# Patient Record
Sex: FEMALE | Race: WHITE | Employment: UNEMPLOYED | URBAN - METROPOLITAN AREA
[De-identification: names, ages, dates, MRNs, and addresses within clinical notes are randomized per-mention and may not be internally consistent; named-entity substitution may affect disease eponyms.]

---

## 2017-03-13 PROBLEM — R07.9 CHEST PAIN: Status: ACTIVE | Noted: 2017-03-13

## 2017-11-27 PROBLEM — R07.9 CHEST PAIN: Status: RESOLVED | Noted: 2017-03-13 | Resolved: 2017-11-27

## 2018-01-30 PROBLEM — R29.818 SUSPECTED SLEEP APNEA: Status: ACTIVE | Noted: 2018-01-30

## 2018-01-30 PROBLEM — R06.00 DYSPNEA: Status: ACTIVE | Noted: 2018-01-30

## 2018-01-30 PROBLEM — J44.9 COPD (CHRONIC OBSTRUCTIVE PULMONARY DISEASE) (HCC): Status: ACTIVE | Noted: 2018-01-30

## 2018-03-08 ENCOUNTER — HOSPITAL ENCOUNTER (OUTPATIENT)
Dept: ULTRASOUND IMAGING | Age: 58
Discharge: HOME | End: 2018-03-08
Attending: FAMILY MEDICINE
Payer: COMMERCIAL

## 2018-03-08 DIAGNOSIS — F17.200 SMOKER: ICD-10-CM

## 2018-03-08 DIAGNOSIS — M79.604 PAIN OF RIGHT LOWER EXTREMITY: ICD-10-CM

## 2018-03-08 PROCEDURE — 93971 EXTREMITY STUDY: CPT

## 2018-04-09 ENCOUNTER — HOSPITAL ENCOUNTER (OUTPATIENT)
Dept: SLEEP MEDICINE | Age: 58
Discharge: HOME | End: 2018-04-09
Payer: COMMERCIAL

## 2018-04-09 PROCEDURE — 95810 POLYSOM 6/> YRS 4/> PARAM: CPT

## 2018-05-31 ENCOUNTER — HOSPITAL ENCOUNTER (OUTPATIENT)
Dept: LAB | Age: 58
Discharge: HOME | End: 2018-05-31

## 2018-05-31 PROCEDURE — 88312 SPECIAL STAINS GROUP 1: CPT | Performed by: INTERNAL MEDICINE

## 2018-05-31 PROCEDURE — 88305 TISSUE EXAM BY PATHOLOGIST: CPT | Performed by: INTERNAL MEDICINE

## 2018-07-02 ENCOUNTER — HOSPITAL ENCOUNTER (OUTPATIENT)
Dept: GENERAL RADIOLOGY | Age: 58
Discharge: HOME | End: 2018-07-02
Payer: COMMERCIAL

## 2018-07-02 DIAGNOSIS — J44.9 CHRONIC OBSTRUCTIVE PULMONARY DISEASE, UNSPECIFIED COPD TYPE (HCC): ICD-10-CM

## 2018-07-02 PROCEDURE — 71046 X-RAY EXAM CHEST 2 VIEWS: CPT

## 2018-07-11 PROBLEM — R09.02 HYPOXIA: Status: ACTIVE | Noted: 2018-07-11

## 2018-07-11 PROBLEM — G47.33 OSA (OBSTRUCTIVE SLEEP APNEA): Status: ACTIVE | Noted: 2018-07-11

## 2018-07-11 PROBLEM — R29.818 SUSPECTED SLEEP APNEA: Status: RESOLVED | Noted: 2018-01-30 | Resolved: 2018-07-11

## 2018-07-31 ENCOUNTER — HOSPITAL ENCOUNTER (OUTPATIENT)
Dept: CT IMAGING | Age: 58
Discharge: HOME | End: 2018-07-31
Payer: COMMERCIAL

## 2018-07-31 DIAGNOSIS — J44.9 CHRONIC OBSTRUCTIVE PULMONARY DISEASE, UNSPECIFIED COPD TYPE (HCC): ICD-10-CM

## 2018-07-31 DIAGNOSIS — F17.200 SMOKER: ICD-10-CM

## 2018-07-31 PROCEDURE — G0297 LDCT FOR LUNG CA SCREEN: HCPCS

## 2018-09-05 PROBLEM — Z87.891 PERSONAL HISTORY OF TOBACCO USE, PRESENTING HAZARDS TO HEALTH: Status: ACTIVE | Noted: 2018-09-05

## 2019-05-05 NOTE — PROGRESS NOTES
No evidence of dvt in right lower extremity. Please advise pt.
No evidence of dvt within right lower extremity. Advise pt.
Pt. Wants to get a referral to [address] orthopaedic
Constipation    MS (multiple sclerosis)

## 2019-05-24 ENCOUNTER — HOSPITAL ENCOUNTER (INPATIENT)
Age: 59
LOS: 1 days | Discharge: HOME | DRG: 247 | End: 2019-05-26
Attending: EMERGENCY MEDICINE | Admitting: INTERNAL MEDICINE
Payer: COMMERCIAL

## 2019-05-24 ENCOUNTER — APPOINTMENT (OUTPATIENT)
Dept: GENERAL RADIOLOGY | Age: 59
DRG: 247 | End: 2019-05-24
Attending: EMERGENCY MEDICINE
Payer: COMMERCIAL

## 2019-05-24 DIAGNOSIS — I21.4 NSTEMI (NON-ST ELEVATED MYOCARDIAL INFARCTION) (HCC): Primary | ICD-10-CM

## 2019-05-24 LAB
ALBUMIN SERPL-MCNC: 3.5 G/DL (ref 3.5–5)
ALBUMIN/GLOB SERPL: 1 {RATIO} (ref 1.2–3.5)
ALP SERPL-CCNC: 71 U/L (ref 50–136)
ALT SERPL-CCNC: 18 U/L (ref 12–65)
ANION GAP SERPL CALC-SCNC: 8 MMOL/L (ref 7–16)
AST SERPL-CCNC: 11 U/L (ref 15–37)
BASOPHILS # BLD: 0.1 K/UL (ref 0–0.2)
BASOPHILS NFR BLD: 1 % (ref 0–2)
BILIRUB SERPL-MCNC: 0.3 MG/DL (ref 0.2–1.1)
BUN SERPL-MCNC: 20 MG/DL (ref 6–23)
CALCIUM SERPL-MCNC: 8.9 MG/DL (ref 8.3–10.4)
CHLORIDE SERPL-SCNC: 110 MMOL/L (ref 98–107)
CO2 SERPL-SCNC: 28 MMOL/L (ref 21–32)
CREAT SERPL-MCNC: 1.03 MG/DL (ref 0.6–1)
DIFFERENTIAL METHOD BLD: ABNORMAL
EOSINOPHIL # BLD: 0.3 K/UL (ref 0–0.8)
EOSINOPHIL NFR BLD: 2 % (ref 0.5–7.8)
ERYTHROCYTE [DISTWIDTH] IN BLOOD BY AUTOMATED COUNT: 13.6 % (ref 11.9–14.6)
GLOBULIN SER CALC-MCNC: 3.6 G/DL (ref 2.3–3.5)
GLUCOSE SERPL-MCNC: 141 MG/DL (ref 65–100)
HCT VFR BLD AUTO: 47.2 % (ref 35.8–46.3)
HGB BLD-MCNC: 16.2 G/DL (ref 11.7–15.4)
IMM GRANULOCYTES # BLD AUTO: 0 K/UL (ref 0–0.5)
IMM GRANULOCYTES NFR BLD AUTO: 0 % (ref 0–5)
LYMPHOCYTES # BLD: 3.7 K/UL (ref 0.5–4.6)
LYMPHOCYTES NFR BLD: 32 % (ref 13–44)
MCH RBC QN AUTO: 30.8 PG (ref 26.1–32.9)
MCHC RBC AUTO-ENTMCNC: 34.3 G/DL (ref 31.4–35)
MCV RBC AUTO: 89.7 FL (ref 79.6–97.8)
MONOCYTES # BLD: 0.8 K/UL (ref 0.1–1.3)
MONOCYTES NFR BLD: 6 % (ref 4–12)
NEUTS SEG # BLD: 6.8 K/UL (ref 1.7–8.2)
NEUTS SEG NFR BLD: 58 % (ref 43–78)
NRBC # BLD: 0 K/UL (ref 0–0.2)
PLATELET # BLD AUTO: 188 K/UL (ref 150–450)
PMV BLD AUTO: 10.7 FL (ref 9.4–12.3)
POTASSIUM SERPL-SCNC: 3.3 MMOL/L (ref 3.5–5.1)
PROT SERPL-MCNC: 7.1 G/DL (ref 6.3–8.2)
RBC # BLD AUTO: 5.26 M/UL (ref 4.05–5.2)
SODIUM SERPL-SCNC: 146 MMOL/L (ref 136–145)
TROPONIN I BLD-MCNC: 0.04 NG/ML (ref 0.02–0.05)
TROPONIN I SERPL-MCNC: 0.11 NG/ML (ref 0.02–0.05)
WBC # BLD AUTO: 11.7 K/UL (ref 4.3–11.1)

## 2019-05-24 PROCEDURE — 84484 ASSAY OF TROPONIN QUANT: CPT

## 2019-05-24 PROCEDURE — 85025 COMPLETE CBC W/AUTO DIFF WBC: CPT

## 2019-05-24 PROCEDURE — 93005 ELECTROCARDIOGRAM TRACING: CPT | Performed by: EMERGENCY MEDICINE

## 2019-05-24 PROCEDURE — 99285 EMERGENCY DEPT VISIT HI MDM: CPT | Performed by: EMERGENCY MEDICINE

## 2019-05-24 PROCEDURE — 80053 COMPREHEN METABOLIC PANEL: CPT

## 2019-05-24 PROCEDURE — 83880 ASSAY OF NATRIURETIC PEPTIDE: CPT

## 2019-05-24 PROCEDURE — 71045 X-RAY EXAM CHEST 1 VIEW: CPT

## 2019-05-24 RX ORDER — HEPARIN SODIUM 5000 [USP'U]/ML
5000 INJECTION, SOLUTION INTRAVENOUS; SUBCUTANEOUS
Status: COMPLETED | OUTPATIENT
Start: 2019-05-24 | End: 2019-05-25

## 2019-05-24 RX ORDER — HEPARIN SODIUM 5000 [USP'U]/100ML
12-25 INJECTION, SOLUTION INTRAVENOUS
Status: DISCONTINUED | OUTPATIENT
Start: 2019-05-24 | End: 2019-05-25

## 2019-05-25 PROBLEM — Z72.0 TOBACCO ABUSE: Chronic | Status: ACTIVE | Noted: 2018-09-05

## 2019-05-25 PROBLEM — F43.9 STRESS AT HOME: Status: ACTIVE | Noted: 2019-05-25

## 2019-05-25 PROBLEM — K21.9 GERD (GASTROESOPHAGEAL REFLUX DISEASE): Chronic | Status: ACTIVE | Noted: 2019-05-25

## 2019-05-25 PROBLEM — I21.4 NSTEMI (NON-ST ELEVATED MYOCARDIAL INFARCTION) (HCC): Status: ACTIVE | Noted: 2019-05-25

## 2019-05-25 PROBLEM — Z72.0 TOBACCO ABUSE: Status: ACTIVE | Noted: 2018-09-05

## 2019-05-25 PROBLEM — E11.9 TYPE 2 DIABETES MELLITUS (HCC): Chronic | Status: ACTIVE | Noted: 2019-05-25

## 2019-05-25 PROBLEM — J44.9 COPD (CHRONIC OBSTRUCTIVE PULMONARY DISEASE) (HCC): Chronic | Status: ACTIVE | Noted: 2018-01-30

## 2019-05-25 LAB
ANION GAP SERPL CALC-SCNC: 7 MMOL/L (ref 7–16)
APTT PPP: 35.1 SEC (ref 24.7–39.8)
APTT PPP: 66.4 SEC (ref 24.7–39.8)
ATRIAL RATE: 64 BPM
ATRIAL RATE: 74 BPM
ATRIAL RATE: 78 BPM
BNP SERPL-MCNC: 24 PG/ML
BUN SERPL-MCNC: 19 MG/DL (ref 6–23)
CALCIUM SERPL-MCNC: 9 MG/DL (ref 8.3–10.4)
CALCULATED P AXIS, ECG09: 51 DEGREES
CALCULATED P AXIS, ECG09: 64 DEGREES
CALCULATED P AXIS, ECG09: 65 DEGREES
CALCULATED R AXIS, ECG10: 24 DEGREES
CALCULATED R AXIS, ECG10: 28 DEGREES
CALCULATED R AXIS, ECG10: 49 DEGREES
CALCULATED T AXIS, ECG11: 40 DEGREES
CALCULATED T AXIS, ECG11: 44 DEGREES
CALCULATED T AXIS, ECG11: 63 DEGREES
CHLORIDE SERPL-SCNC: 111 MMOL/L (ref 98–107)
CHOLEST SERPL-MCNC: 243 MG/DL
CO2 SERPL-SCNC: 26 MMOL/L (ref 21–32)
CREAT SERPL-MCNC: 0.8 MG/DL (ref 0.6–1)
DIAGNOSIS, 93000: NORMAL
ERYTHROCYTE [DISTWIDTH] IN BLOOD BY AUTOMATED COUNT: 13.6 % (ref 11.9–14.6)
GLUCOSE BLD STRIP.AUTO-MCNC: 105 MG/DL (ref 65–100)
GLUCOSE BLD STRIP.AUTO-MCNC: 108 MG/DL (ref 65–100)
GLUCOSE BLD STRIP.AUTO-MCNC: 110 MG/DL (ref 65–100)
GLUCOSE BLD STRIP.AUTO-MCNC: 124 MG/DL (ref 65–100)
GLUCOSE SERPL-MCNC: 100 MG/DL (ref 65–100)
HCT VFR BLD AUTO: 46.7 % (ref 35.8–46.3)
HDLC SERPL-MCNC: 34 MG/DL (ref 40–60)
HDLC SERPL: 7.1 {RATIO}
HGB BLD-MCNC: 15.8 G/DL (ref 11.7–15.4)
INR PPP: 1
LDLC SERPL CALC-MCNC: 165 MG/DL
LIPID PROFILE,FLP: ABNORMAL
MAGNESIUM SERPL-MCNC: 2.1 MG/DL (ref 1.8–2.4)
MCH RBC QN AUTO: 30.8 PG (ref 26.1–32.9)
MCHC RBC AUTO-ENTMCNC: 33.8 G/DL (ref 31.4–35)
MCV RBC AUTO: 91 FL (ref 79.6–97.8)
NRBC # BLD: 0 K/UL (ref 0–0.2)
P-R INTERVAL, ECG05: 192 MS
P-R INTERVAL, ECG05: 194 MS
P-R INTERVAL, ECG05: 198 MS
PLATELET # BLD AUTO: 149 K/UL (ref 150–450)
PMV BLD AUTO: 10.7 FL (ref 9.4–12.3)
POTASSIUM SERPL-SCNC: 3.4 MMOL/L (ref 3.5–5.1)
PROTHROMBIN TIME: 12.9 SEC (ref 11.7–14.5)
Q-T INTERVAL, ECG07: 408 MS
Q-T INTERVAL, ECG07: 408 MS
Q-T INTERVAL, ECG07: 426 MS
QRS DURATION, ECG06: 86 MS
QRS DURATION, ECG06: 90 MS
QRS DURATION, ECG06: 92 MS
QTC CALCULATION (BEZET), ECG08: 439 MS
QTC CALCULATION (BEZET), ECG08: 452 MS
QTC CALCULATION (BEZET), ECG08: 465 MS
RBC # BLD AUTO: 5.13 M/UL (ref 4.05–5.2)
SODIUM SERPL-SCNC: 144 MMOL/L (ref 136–145)
TRIGL SERPL-MCNC: 220 MG/DL (ref 35–150)
TROPONIN I BLD-MCNC: 0.02 NG/ML (ref 0.02–0.05)
TROPONIN I SERPL-MCNC: 0.1 NG/ML (ref 0.02–0.05)
TROPONIN I SERPL-MCNC: 0.11 NG/ML (ref 0.02–0.05)
VENTRICULAR RATE, ECG03: 64 BPM
VENTRICULAR RATE, ECG03: 74 BPM
VENTRICULAR RATE, ECG03: 78 BPM
VLDLC SERPL CALC-MCNC: 44 MG/DL (ref 6–23)
WBC # BLD AUTO: 10.4 K/UL (ref 4.3–11.1)

## 2019-05-25 PROCEDURE — 85347 COAGULATION TIME ACTIVATED: CPT

## 2019-05-25 PROCEDURE — 74011250636 HC RX REV CODE- 250/636

## 2019-05-25 PROCEDURE — 94640 AIRWAY INHALATION TREATMENT: CPT

## 2019-05-25 PROCEDURE — 99152 MOD SED SAME PHYS/QHP 5/>YRS: CPT

## 2019-05-25 PROCEDURE — 99153 MOD SED SAME PHYS/QHP EA: CPT

## 2019-05-25 PROCEDURE — 74011000250 HC RX REV CODE- 250: Performed by: NURSE PRACTITIONER

## 2019-05-25 PROCEDURE — 74011250636 HC RX REV CODE- 250/636: Performed by: NURSE PRACTITIONER

## 2019-05-25 PROCEDURE — 74011250636 HC RX REV CODE- 250/636: Performed by: INTERNAL MEDICINE

## 2019-05-25 PROCEDURE — C1769 GUIDE WIRE: HCPCS

## 2019-05-25 PROCEDURE — B2151ZZ FLUOROSCOPY OF LEFT HEART USING LOW OSMOLAR CONTRAST: ICD-10-PCS | Performed by: INTERNAL MEDICINE

## 2019-05-25 PROCEDURE — 65660000000 HC RM CCU STEPDOWN

## 2019-05-25 PROCEDURE — 82962 GLUCOSE BLOOD TEST: CPT

## 2019-05-25 PROCEDURE — 77030029997 HC DEV COM RDL R BND TELE -B

## 2019-05-25 PROCEDURE — 84484 ASSAY OF TROPONIN QUANT: CPT

## 2019-05-25 PROCEDURE — 85730 THROMBOPLASTIN TIME PARTIAL: CPT

## 2019-05-25 PROCEDURE — 80048 BASIC METABOLIC PNL TOTAL CA: CPT

## 2019-05-25 PROCEDURE — 92928 PRQ TCAT PLMT NTRAC ST 1 LES: CPT

## 2019-05-25 PROCEDURE — 96374 THER/PROPH/DIAG INJ IV PUSH: CPT | Performed by: EMERGENCY MEDICINE

## 2019-05-25 PROCEDURE — 94760 N-INVAS EAR/PLS OXIMETRY 1: CPT

## 2019-05-25 PROCEDURE — 83735 ASSAY OF MAGNESIUM: CPT

## 2019-05-25 PROCEDURE — 77030004534 HC CATH ANGI DX INFN CARD -A

## 2019-05-25 PROCEDURE — 74011250636 HC RX REV CODE- 250/636: Performed by: EMERGENCY MEDICINE

## 2019-05-25 PROCEDURE — 77030015766

## 2019-05-25 PROCEDURE — 93458 L HRT ARTERY/VENTRICLE ANGIO: CPT

## 2019-05-25 PROCEDURE — 74011636320 HC RX REV CODE- 636/320: Performed by: INTERNAL MEDICINE

## 2019-05-25 PROCEDURE — 85610 PROTHROMBIN TIME: CPT

## 2019-05-25 PROCEDURE — C1874 STENT, COATED/COV W/DEL SYS: HCPCS

## 2019-05-25 PROCEDURE — 36415 COLL VENOUS BLD VENIPUNCTURE: CPT

## 2019-05-25 PROCEDURE — 74011000250 HC RX REV CODE- 250: Performed by: INTERNAL MEDICINE

## 2019-05-25 PROCEDURE — C1887 CATHETER, GUIDING: HCPCS

## 2019-05-25 PROCEDURE — 4A023N7 MEASUREMENT OF CARDIAC SAMPLING AND PRESSURE, LEFT HEART, PERCUTANEOUS APPROACH: ICD-10-PCS | Performed by: INTERNAL MEDICINE

## 2019-05-25 PROCEDURE — C1725 CATH, TRANSLUMIN NON-LASER: HCPCS

## 2019-05-25 PROCEDURE — 80061 LIPID PANEL: CPT

## 2019-05-25 PROCEDURE — 77030020263 HC SOL INJ SOD CL0.9% LFCR 1000ML

## 2019-05-25 PROCEDURE — 74011250637 HC RX REV CODE- 250/637: Performed by: INTERNAL MEDICINE

## 2019-05-25 PROCEDURE — B2111ZZ FLUOROSCOPY OF MULTIPLE CORONARY ARTERIES USING LOW OSMOLAR CONTRAST: ICD-10-PCS | Performed by: INTERNAL MEDICINE

## 2019-05-25 PROCEDURE — 93005 ELECTROCARDIOGRAM TRACING: CPT | Performed by: INTERNAL MEDICINE

## 2019-05-25 PROCEDURE — 74011250637 HC RX REV CODE- 250/637: Performed by: NURSE PRACTITIONER

## 2019-05-25 PROCEDURE — 027035Z DILATION OF CORONARY ARTERY, ONE ARTERY WITH TWO DRUG-ELUTING INTRALUMINAL DEVICES, PERCUTANEOUS APPROACH: ICD-10-PCS | Performed by: INTERNAL MEDICINE

## 2019-05-25 PROCEDURE — 85027 COMPLETE CBC AUTOMATED: CPT

## 2019-05-25 PROCEDURE — 93306 TTE W/DOPPLER COMPLETE: CPT

## 2019-05-25 RX ORDER — HEPARIN SODIUM 10000 [USP'U]/ML
2000 INJECTION, SOLUTION INTRAVENOUS; SUBCUTANEOUS
Status: DISCONTINUED | OUTPATIENT
Start: 2019-05-25 | End: 2019-05-26 | Stop reason: HOSPADM

## 2019-05-25 RX ORDER — SODIUM CHLORIDE 9 MG/ML
75 INJECTION, SOLUTION INTRAVENOUS CONTINUOUS
Status: DISCONTINUED | OUTPATIENT
Start: 2019-05-25 | End: 2019-05-25

## 2019-05-25 RX ORDER — SODIUM CHLORIDE 0.9 % (FLUSH) 0.9 %
5-40 SYRINGE (ML) INJECTION AS NEEDED
Status: DISCONTINUED | OUTPATIENT
Start: 2019-05-25 | End: 2019-05-26 | Stop reason: HOSPADM

## 2019-05-25 RX ORDER — HYDROCODONE BITARTRATE AND ACETAMINOPHEN 5; 325 MG/1; MG/1
1 TABLET ORAL
Status: DISCONTINUED | OUTPATIENT
Start: 2019-05-25 | End: 2019-05-26 | Stop reason: HOSPADM

## 2019-05-25 RX ORDER — ACETAMINOPHEN 325 MG/1
650 TABLET ORAL
Status: DISCONTINUED | OUTPATIENT
Start: 2019-05-25 | End: 2019-05-25 | Stop reason: SDUPTHER

## 2019-05-25 RX ORDER — LIDOCAINE HYDROCHLORIDE 10 MG/ML
1-20 INJECTION INFILTRATION; PERINEURAL ONCE
Status: COMPLETED | OUTPATIENT
Start: 2019-05-25 | End: 2019-05-25

## 2019-05-25 RX ORDER — HEPARIN SODIUM 5000 [USP'U]/ML
35 INJECTION, SOLUTION INTRAVENOUS; SUBCUTANEOUS ONCE
Status: COMPLETED | OUTPATIENT
Start: 2019-05-25 | End: 2019-05-25

## 2019-05-25 RX ORDER — ALBUTEROL SULFATE 0.83 MG/ML
2.5 SOLUTION RESPIRATORY (INHALATION)
Status: DISCONTINUED | OUTPATIENT
Start: 2019-05-25 | End: 2019-05-26 | Stop reason: HOSPADM

## 2019-05-25 RX ORDER — INSULIN LISPRO 100 [IU]/ML
INJECTION, SOLUTION INTRAVENOUS; SUBCUTANEOUS
Status: DISCONTINUED | OUTPATIENT
Start: 2019-05-25 | End: 2019-05-26 | Stop reason: HOSPADM

## 2019-05-25 RX ORDER — NITROGLYCERIN 0.4 MG/1
0.4 TABLET SUBLINGUAL
Status: DISCONTINUED | OUTPATIENT
Start: 2019-05-25 | End: 2019-05-26 | Stop reason: HOSPADM

## 2019-05-25 RX ORDER — ROSUVASTATIN CALCIUM 20 MG/1
40 TABLET, COATED ORAL
Status: DISCONTINUED | OUTPATIENT
Start: 2019-05-25 | End: 2019-05-26 | Stop reason: HOSPADM

## 2019-05-25 RX ORDER — LORATADINE 10 MG/1
10 TABLET ORAL DAILY
Status: DISCONTINUED | OUTPATIENT
Start: 2019-05-25 | End: 2019-05-26 | Stop reason: HOSPADM

## 2019-05-25 RX ORDER — MORPHINE SULFATE 2 MG/ML
2 INJECTION, SOLUTION INTRAMUSCULAR; INTRAVENOUS
Status: DISCONTINUED | OUTPATIENT
Start: 2019-05-25 | End: 2019-05-26 | Stop reason: HOSPADM

## 2019-05-25 RX ORDER — NALOXONE HYDROCHLORIDE 0.4 MG/ML
0.4 INJECTION, SOLUTION INTRAMUSCULAR; INTRAVENOUS; SUBCUTANEOUS AS NEEDED
Status: DISCONTINUED | OUTPATIENT
Start: 2019-05-25 | End: 2019-05-26 | Stop reason: HOSPADM

## 2019-05-25 RX ORDER — SODIUM CHLORIDE 0.9 % (FLUSH) 0.9 %
5-40 SYRINGE (ML) INJECTION EVERY 8 HOURS
Status: DISCONTINUED | OUTPATIENT
Start: 2019-05-25 | End: 2019-05-26 | Stop reason: HOSPADM

## 2019-05-25 RX ORDER — MAG HYDROX/ALUMINUM HYD/SIMETH 200-200-20
30 SUSPENSION, ORAL (FINAL DOSE FORM) ORAL AS NEEDED
Status: DISCONTINUED | OUTPATIENT
Start: 2019-05-25 | End: 2019-05-26 | Stop reason: HOSPADM

## 2019-05-25 RX ORDER — HEPARIN SODIUM 200 [USP'U]/100ML
3 INJECTION, SOLUTION INTRAVENOUS CONTINUOUS
Status: DISCONTINUED | OUTPATIENT
Start: 2019-05-25 | End: 2019-05-25

## 2019-05-25 RX ORDER — ASPIRIN 81 MG/1
81 TABLET ORAL DAILY
Status: DISCONTINUED | OUTPATIENT
Start: 2019-05-25 | End: 2019-05-26 | Stop reason: HOSPADM

## 2019-05-25 RX ORDER — PRAVASTATIN SODIUM 20 MG/1
TABLET ORAL
Status: DISCONTINUED
Start: 2019-05-25 | End: 2019-05-25 | Stop reason: WASHOUT

## 2019-05-25 RX ORDER — PANTOPRAZOLE SODIUM 40 MG/1
40 TABLET, DELAYED RELEASE ORAL
Status: DISCONTINUED | OUTPATIENT
Start: 2019-05-25 | End: 2019-05-26 | Stop reason: HOSPADM

## 2019-05-25 RX ORDER — ACETAMINOPHEN 325 MG/1
650 TABLET ORAL
Status: DISCONTINUED | OUTPATIENT
Start: 2019-05-25 | End: 2019-05-26 | Stop reason: HOSPADM

## 2019-05-25 RX ORDER — ONDANSETRON 2 MG/ML
4 INJECTION INTRAMUSCULAR; INTRAVENOUS
Status: DISCONTINUED | OUTPATIENT
Start: 2019-05-25 | End: 2019-05-26 | Stop reason: HOSPADM

## 2019-05-25 RX ORDER — LORAZEPAM 2 MG/ML
1 INJECTION INTRAMUSCULAR ONCE
Status: COMPLETED | OUTPATIENT
Start: 2019-05-25 | End: 2019-05-25

## 2019-05-25 RX ORDER — SODIUM CHLORIDE 9 MG/ML
75 INJECTION, SOLUTION INTRAVENOUS CONTINUOUS
Status: DISCONTINUED | OUTPATIENT
Start: 2019-05-25 | End: 2019-05-26 | Stop reason: HOSPADM

## 2019-05-25 RX ORDER — PRAVASTATIN SODIUM 20 MG/1
40 TABLET ORAL
Status: DISCONTINUED | OUTPATIENT
Start: 2019-05-25 | End: 2019-05-25

## 2019-05-25 RX ORDER — MIDAZOLAM HYDROCHLORIDE 1 MG/ML
.5-5 INJECTION, SOLUTION INTRAMUSCULAR; INTRAVENOUS
Status: DISCONTINUED | OUTPATIENT
Start: 2019-05-25 | End: 2019-05-26 | Stop reason: HOSPADM

## 2019-05-25 RX ORDER — FENTANYL CITRATE 50 UG/ML
25-100 INJECTION, SOLUTION INTRAMUSCULAR; INTRAVENOUS
Status: DISCONTINUED | OUTPATIENT
Start: 2019-05-25 | End: 2019-05-25

## 2019-05-25 RX ORDER — NITROGLYCERIN 0.4 MG/1
0.4 TABLET SUBLINGUAL
Status: DISCONTINUED | OUTPATIENT
Start: 2019-05-25 | End: 2019-05-25 | Stop reason: SDUPTHER

## 2019-05-25 RX ADMIN — ALBUTEROL SULFATE 2.5 MG: 2.5 SOLUTION RESPIRATORY (INHALATION) at 20:32

## 2019-05-25 RX ADMIN — NITROGLYCERIN 1 INCH: 20 OINTMENT TOPICAL at 05:17

## 2019-05-25 RX ADMIN — NITROGLYCERIN 1 INCH: 20 OINTMENT TOPICAL at 02:08

## 2019-05-25 RX ADMIN — FENTANYL CITRATE 25 MCG: 50 INJECTION, SOLUTION INTRAMUSCULAR; INTRAVENOUS at 12:09

## 2019-05-25 RX ADMIN — NITROGLYCERIN 0.4 MG: 0.4 TABLET, ORALLY DISINTEGRATING SUBLINGUAL at 20:30

## 2019-05-25 RX ADMIN — ASPIRIN 81 MG: 81 TABLET, COATED ORAL at 09:15

## 2019-05-25 RX ADMIN — HEPARIN SODIUM 3 ML/HR: 5000 INJECTION, SOLUTION INTRAVENOUS; SUBCUTANEOUS at 11:54

## 2019-05-25 RX ADMIN — LIDOCAINE HYDROCHLORIDE 3 ML: 10 INJECTION, SOLUTION INFILTRATION; PERINEURAL at 11:55

## 2019-05-25 RX ADMIN — ROSUVASTATIN CALCIUM 40 MG: 20 TABLET, COATED ORAL at 23:25

## 2019-05-25 RX ADMIN — HEPARIN SODIUM 12 UNITS/KG/HR: 5000 INJECTION, SOLUTION INTRAVENOUS at 00:41

## 2019-05-25 RX ADMIN — Medication 5 ML: at 02:08

## 2019-05-25 RX ADMIN — TIOTROPIUM BROMIDE 18 MCG: 18 CAPSULE ORAL; RESPIRATORY (INHALATION) at 09:00

## 2019-05-25 RX ADMIN — Medication 10 ML: at 23:29

## 2019-05-25 RX ADMIN — ALUMINUM HYDROXIDE, MAGNESIUM HYDROXIDE, AND SIMETHICONE 30 ML: 200; 200; 20 SUSPENSION ORAL at 12:33

## 2019-05-25 RX ADMIN — HEPARIN SODIUM 2000 UNITS: 10000 INJECTION INTRAVENOUS; SUBCUTANEOUS at 12:36

## 2019-05-25 RX ADMIN — IOPAMIDOL 170 ML: 755 INJECTION, SOLUTION INTRAVENOUS at 12:32

## 2019-05-25 RX ADMIN — HEPARIN SODIUM 3450 UNITS: 5000 INJECTION INTRAVENOUS; SUBCUTANEOUS at 09:17

## 2019-05-25 RX ADMIN — PANTOPRAZOLE SODIUM 40 MG: 40 TABLET, DELAYED RELEASE ORAL at 09:15

## 2019-05-25 RX ADMIN — MIDAZOLAM HYDROCHLORIDE 2 MG: 1 INJECTION, SOLUTION INTRAMUSCULAR; INTRAVENOUS at 11:54

## 2019-05-25 RX ADMIN — LORATADINE 10 MG: 10 TABLET ORAL at 09:15

## 2019-05-25 RX ADMIN — NITROGLYCERIN 0.1 MG: 200 INJECTION, SOLUTION INTRAVENOUS at 12:22

## 2019-05-25 RX ADMIN — TICAGRELOR 180 MG: 90 TABLET ORAL at 12:33

## 2019-05-25 RX ADMIN — HEPARIN SODIUM 5000 UNITS: 10000 INJECTION INTRAVENOUS; SUBCUTANEOUS at 12:05

## 2019-05-25 RX ADMIN — HEPARIN SODIUM 5000 UNITS: 5000 INJECTION INTRAVENOUS; SUBCUTANEOUS at 00:41

## 2019-05-25 RX ADMIN — MIDAZOLAM HYDROCHLORIDE 1 MG: 1 INJECTION, SOLUTION INTRAMUSCULAR; INTRAVENOUS at 12:10

## 2019-05-25 RX ADMIN — ALBUTEROL SULFATE 2.5 MG: 2.5 SOLUTION RESPIRATORY (INHALATION) at 09:01

## 2019-05-25 RX ADMIN — LORAZEPAM 1 MG: 2 INJECTION, SOLUTION INTRAMUSCULAR; INTRAVENOUS at 21:23

## 2019-05-25 RX ADMIN — Medication 10 ML: at 05:17

## 2019-05-25 RX ADMIN — SODIUM CHLORIDE 75 ML/HR: 900 INJECTION, SOLUTION INTRAVENOUS at 05:18

## 2019-05-25 RX ADMIN — HEPARIN SODIUM 2 ML: 10000 INJECTION, SOLUTION INTRAVENOUS; SUBCUTANEOUS at 11:57

## 2019-05-25 RX ADMIN — FENTANYL CITRATE 25 MCG: 50 INJECTION, SOLUTION INTRAMUSCULAR; INTRAVENOUS at 11:54

## 2019-05-25 RX ADMIN — NITROGLYCERIN 0.15 MG: 200 INJECTION, SOLUTION INTRAVENOUS at 12:16

## 2019-05-25 RX ADMIN — HEPARIN SODIUM 2000 UNITS: 10000 INJECTION INTRAVENOUS; SUBCUTANEOUS at 12:12
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
## 2019-05-25 NOTE — ED NOTES
TRANSFER - OUT REPORT:    Verbal report given to KRISTIN Valencia(name) on Anna Jordan  being transferred to 312(unit) for routine progression of care       Report consisted of patients Situation, Background, Assessment and   Recommendations(SBAR). Information from the following report(s) ED Summary, MAR, Recent Results and Cardiac Rhythm NSR was reviewed with the receiving nurse. Lines:   Peripheral IV 05/25/19 Anterior; Left Wrist (Active)   Site Assessment Clean, dry, & intact 5/25/2019  1:15 AM   Phlebitis Assessment 0 5/25/2019  1:15 AM   Infiltration Assessment 0 5/25/2019  1:15 AM   Dressing Status Clean, dry, & intact 5/25/2019  1:15 AM   Hub Color/Line Status Green 5/25/2019  1:15 AM        Opportunity for questions and clarification was provided.       Patient transported with:   Monitor  Registered Nurse

## 2019-05-25 NOTE — PROGRESS NOTES
Bedside and Verbal shift change report given to Pamela DESAI (oncoming nurse) by self (offgoing nurse). Report included the following information SBAR, Kardex, Intake/Output, MAR and Recent Results. Heparin gtt verified at bedside.

## 2019-05-25 NOTE — PROCEDURES
Brief Cardiac Procedure Note    Patient: Sydnee Rizo MRN: [id number]  SSN: [id number]    YOB: 1960  Age: 66 y.o. Sex: female      Date of Procedure: 5/25/2019     Pre-procedure Diagnosis: NSTEMI    Post-procedure Diagnosis: Coronary artery disease    Procedure: Left Heart Catheterization with PCI    Brief Description of Procedure: As above    Performed By: Lucia Sanchez MD     Assistants: None    Anesthesia: Moderate Sedation    Estimated Blood Loss: Less than 10 mL      Specimens: None    Implants: None    Findings: Obstructive CAD. PCI of mRCA with 3 x 22 and 3 x 15 mm Resolute Isra    Complications: None    Recommendations: Continue medical therapy.     Signed By: Lucia Sanchez MD     May 25, 2019

## 2019-05-25 NOTE — PROGRESS NOTES
Bedside and Verbal shift report received from [address]. Included SBAR, MAR, KARDEX, I's and O's, and Recent Results.

## 2019-05-25 NOTE — PROGRESS NOTES
TRANSFER - OUT REPORT:    Verbal report given to ashlie foster(name) on [facility]  being transferred to tele(unit) for routine progression of care       Report consisted of patients Situation, Background, Assessment and   Recommendations(SBAR). Information from the following report(s) SBAR was reviewed with the receiving nurse. Opportunity for questions and clarification was provided. Procedure: [facility]   Finding Summary: rca stented(cath/pci/pacer settings)  Location: right wrist    Closure Device: r band 14ml(yes/no/description)  Post Site Assessment: no bleeding or hematoma     Pre Procedure Meds:(if any)      Intra Procedure Meds:    Versed: 4mg  Fentanyl: 100mcg  Heparin: 9,000 units  Angiomax Stop Time: na  Reopro:  na  Integrelin: na  Antiplatelet: brilinta 712SM             Peripheral IV 05/25/19 Anterior; Left Wrist (Active)   Site Assessment Clean, dry, & intact 5/25/2019  7:18 AM   Phlebitis Assessment 0 5/25/2019  7:18 AM   Infiltration Assessment 0 5/25/2019  7:18 AM   Dressing Status Clean, dry, & intact 5/25/2019  7:18 AM   Dressing Type Tape;Transparent 5/25/2019  7:18 AM   Hub Color/Line Status Green; Infusing 5/25/2019  7:18 AM   Alcohol Cap Used No 5/25/2019  4:00 AM        Post-Procedure Site Assessment (1)  Wound Type: Catheter entry/exit  Location: Wrist  Orientation : Right  Hemostasis : TR Band(14ml)  Site Assessment: No bleeding, No hematoma, Dry/intact                       is allergic to atrovent [ipratropium bromide]; erythromycin; fluticasone propion-salmeterol; lipitor [atorvastatin]; lisinopril; and other plant, animal, environmental.    Past Medical History:   Diagnosis Date    Anxiety     Depression     HTN (hypertension), benign     Migraine headache      Visit Vitals  /77   Pulse 64   Temp 97.8 °F (36.6 °C)   Resp 16   Ht 5' 1\" (1.549 m)   Wt 98.7 kg (217 lb 11.2 oz)   SpO2 95%   BMI 41.13 kg/m²

## 2019-05-25 NOTE — PROCEDURES
[facility]  CARDIAC CATH    Name:  Fahad Katz  MR#:  [id number]  :  1960  ACCOUNT #:  [de-identified]  DATE OF SERVICE:  2019    PROCEDURES PERFORMED:  1. Left heart cath, selective coronary arteriography, and left ventriculogram via the right radial approach. 2.  PCI and stenting of the mid right coronary artery. PREOPERATIVE DIAGNOSIS:  Chest pain in a patient who ruled in for non-ST-elevation myocardial infarction. Cardiac catheterization felt indicated. POSTOPERATIVE DIAGNOSIS:  Obstructive coronary artery disease status post percutaneous revascularization. SURGEON:  Christine Delarosa. MD Roberto    ASSISTANT:  None. ESTIMATED BLOOD LOSS:  5 mL. SPECIMENS REMOVED:  None. COMPLICATIONS:  None. FINDINGS:  As below. IMPLANTS:  Resolute Isra drug-eluting stents as outlined below. ANESTHESIA:  Patient received moderate supervised conscious sedation with 3 mg of Versed, 50 mcg of fentanyl. Sedation was administered by Nathan Welch under my supervision. Sedation start time was 11:56 with a procedure completion time of 12:33. TECHNICAL FACTORS:  After informed consent was obtained, patient was brought to the cardiac catheterization lab. The right radial artery was prepped and draped in the usual sterile fashion. Utilizing modified Seldinger technique and micropuncture needle, the right radial artery was entered. A 6-Cymraes Terumo slender sheath was placed without difficulty. A radial cocktail consisting of 2000 units of heparin, 2 mg of verapamil, and 200 mcg of nitroglycerin was administered. A 5-Cymraes Tiger 4.0 catheter was used to select and engage the ostium of the left main coronary artery and right coronary artery respectively. Selective injection verification performed. A pigtail catheter was used to cross the aortic valve into the left ventricle. Hemodynamic measurements and left ventriculogram were obtained.   Left ventricular aortic pressure gradient was obtained by pullback technique. At the conclusion of the diagnostic procedure,  patient was referred for PCI of the RCA. Please see procedure notes for details. CONTRAST:  Isovue 170. HEMODYNAMIC RESULTS:  1. Aortic pressure 110/67. 2.  Left ventricular end-diastolic pressure 24.  3.  There was no significant gradient across the aortic valve. ANGIOGRAPHIC RESULTS:  1. Left main coronary artery:  Large caliber vessel. Angiographically normal.  2.  LAD:  Medium to large caliber vessel. 10-20% mid stenosis. 3.  First diagonal artery:  Medium caliber vessel. Patent. 4.  Left circumflex: This is a medium caliber nondominant vessel. Contains 40% eccentric proximal stenosis and 20% mid stenosis. 5.  First obtuse marginal artery:  Small caliber vessel. Patent. 6.  Second obtuse marginal artery:  Medium caliber vessel. Patent. 7.  Right coronary artery is a medium caliber codominant vessel. 40% proximal serial stenosis. There is a high-grade 99% stenosis at the mid to distal transition. There is HUGH 2 distal flow with some collateralization from the left coronary artery. 8.  Left ventriculogram performed in the CARDENAS projection shows normal LV systolic function. EF 55%. Mild inferior hypokinesis. CONCLUSIONS:  1.  Obstructive one-vessel coronary artery disease involving the mid to distal RCA. This is the obvious culprit of her non-ST-elevation myocardial infarction. 2.  Normal left ventricular systolic function with segmental wall motion abnormalities consistent with ischemic cardiomyopathy. PLAN:  Proceed with PCI. PCI:  Lesion:  Mid to distal RCA. Pre-stenosis 99%, post-stenosis 0%. TECHNICAL FACTORS:  A JR4 guide was advanced. There was some difficulty navigating into the brachial artery. This was overcome with a 5-Belarusian multipurpose catheter to straighten the end of the guide out. With this, I was able to enter into the ascending aorta.   The right coronary artery was engaged. The patient was given 5000 units of heparin. ACT was 290. Additional 2000 units of heparin was given. A Whisper wire was placed distal to the stenosis. The stenosis was predilated with a 2.5 x 12 mm Euphora balloon to 12 atmospheres. Following predilatation, a Resolute Isra 3.0 x 22 mm drug-eluting stent was positioned and deployed to 14 atmospheres. Just prior to stent deployment, patient had movement which made the stent go distal.  Following stent placement, there was good angiographic result, but there was a residual 40% proximal stenosis. There was some hazy appearance of this. The stent was then postdilated with 3.0 x 12 mm NC balloon to 18 atmospheres. Following this, the wire was pulled back and intracoronary nitroglycerin was given. I remain concerned about the proximal portion of the stent. Based on this, the wire was repositioned and a 3.0 x 15 mm Resolute Isra was overlapped more proximally and deployed to 14 atmospheres. The stent balloon was then advanced in the overlap region and deployed to 18 atmospheres. Following postdilatation, there was excellent angiographic result with no residual stenosis. The wire was removed and final orthogonal projections were obtained. CONCLUSIONS:  Successful PCI and stenting of the mid to distal RCA with overlapping Resolute Isra 3.0 x 22 and 3.0 x 15 mm drug-eluting stents. PLAN:  Monitor the patient closely in the postprocedural setting.       MD COREY [id number]  D:  05/25/2019 12:52  T:  05/25/2019 13:04  JOB #:  [id number]  CC:  Kelli Tavarez MD

## 2019-05-25 NOTE — PROGRESS NOTES
TRANSFER - OUT REPORT:    Verbal report given to Cath lab at bedside , RN on Evelia Drivers  being transferred to [facility] for ordered procedure       Report consisted of patients Situation, Background, Assessment and Recommendations(SBAR). Information from the following report(s) SBAR, Intake/Output, MAR, Med Rec Status and Cardiac Rhythm NSR was reviewed with the receiving nurse. Opportunity for questions and clarification was provided.

## 2019-05-25 NOTE — PROGRESS NOTES
Bedside and Verbal shift change report given to Son Guerrero (oncoming nurse) by self (offgoing nurse). Report included the following information SBAR, Kardex, MAR and Recent Results.      Heparin gtt running at 12 verified with oncoming RN
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
## 2019-05-25 NOTE — PROGRESS NOTES
Verbal bedside report received from [facility]. Assumed care of patient. Heparin IV drip verified at bedside with outgoing RN.

## 2019-05-25 NOTE — PROGRESS NOTES
Visit with patient to build rapport with . Patient is calm. Receptive to  presence. Encouraged and assured patient of our continued care.     Ash Nassar,  Staff   C: [phone]  /  [email or web address]

## 2019-05-25 NOTE — PROGRESS NOTES
TRANSFER - IN REPORT:    Verbal report received from [facility] on [facility] Drivers being received from ER (unit) for routine progression of care. Report consisted of patients Situation, Background, Assessment and Recommendations(SBAR). Information from the following report(s) SBAR, Kardex, ED Summary, Procedure Summary, Recent Results and Cardiac Rhythm NSR was reviewed. Opportunity for questions and clarification was provided. Assessment completed upon patients arrival to unit and care assumed. Patient received to room 312. Patient connected to monitor and assessment completed. Plan of care reviewed. Patient oriented to room and call light. Patient aware to use call light to communicate any chest pain or needs. Admission skin assessment completed with second RN and reveals the following: Patient has a bruise on the right ankle. Sacrum free of breakdown. Heels are free of bogginess. No other skin abnormalities noted.

## 2019-05-25 NOTE — ED PROVIDER NOTES
67 yo F w/ history of obesity, hypertension, diabetes presents w/ complaint of substernal/left-sided chest pressure with radiation up her neck to her jaw and down her left arm that has been intermittent since Tuesday. States that symptoms have come on with exertion and have occurred at rest. Reports associated shortness of breath. Denies diaphoresis, nausea, vomiting, cough, leg swelling or pain, hemoptysis, dizziness, weakness. States that she had a stress test in 2017 that was \"normal\". Denies history of previous cardiac catheterization or stents. Denies history of DVT or PE. Patient received aspirin and Nitro in route. Reports improvement of pain after receiving nitroglycerin. The history is provided by the patient. No  was used. Chest Pain    This is a new problem. The current episode started more than 2 days ago. The problem has not changed since onset. The problem occurs daily. The pain is associated with rest. The pain is present in the substernal region and left side. The pain is at a severity of 5/10. The pain is moderate. The quality of the pain is described as pressure-like. The pain radiates to the left neck, left jaw, left shoulder and left arm. Associated symptoms include exertional chest pressure and shortness of breath. Pertinent negatives include no abdominal pain, no back pain, no claudication, no cough, no diaphoresis, no dizziness, no fever, no headaches, no hemoptysis, no irregular heartbeat, no leg pain, no lower extremity edema, no malaise/fatigue, no nausea, no near-syncope, no numbness, no orthopnea, no palpitations, no PND, no sputum production, no vomiting and no weakness. She has tried nitroglycerin and aspirin for the symptoms. The treatment provided mild relief. Risk factors include hypertension, male gender, obesity and family history.         Past Medical History:   Diagnosis Date    Anxiety     Depression     HTN (hypertension), benign     Migraine headache        Past Surgical History:   Procedure Laterality Date    HX HYSTERECTOMY  1989    abdominal    HX TONSILLECTOMY  1970         Family History:   Problem Relation Age of Onset   Rahul Tinoco Breast Cancer Mother     Colon Polyps Mother     Depression Mother     Cancer Father         liver    Depression Father        Social History     Socioeconomic History    Marital status:      Spouse name: Not on file    Number of children: Not on file    Years of education: Not on file    Highest education level: Not on file   Occupational History    Not on file   Social Needs    Financial resource strain: Not on file    Food insecurity:     Worry: Not on file     Inability: Not on file    Transportation needs:     Medical: Not on file     Non-medical: Not on file   Tobacco Use    Smoking status: Current Every Day Smoker     Packs/day: 0.50     Years: 40.00     Pack years: 20.00     Types: Cigarettes    Smokeless tobacco: Never Used   Substance and Sexual Activity    Alcohol use: No     Alcohol/week: 0.0 oz    Drug use: No    Sexual activity: Not on file   Lifestyle    Physical activity:     Days per week: Not on file     Minutes per session: Not on file    Stress: Not on file   Relationships    Social connections:     Talks on phone: Not on file     Gets together: Not on file     Attends Faith service: Not on file     Active member of club or organization: Not on file     Attends meetings of clubs or organizations: Not on file     Relationship status: Not on file    Intimate partner violence:     Fear of current or ex partner: Not on file     Emotionally abused: Not on file     Physically abused: Not on file     Forced sexual activity: Not on file   Other Topics Concern    Not on file   Social History Narrative    Not on file         ALLERGIES: Atrovent [ipratropium bromide]; Erythromycin; Fluticasone propion-salmeterol; Lipitor [atorvastatin];  Lisinopril; and Other plant, animal, environmental    Review of Systems   Constitutional: Negative for diaphoresis, fever and malaise/fatigue. HENT: Negative. Respiratory: Positive for shortness of breath. Negative for cough, hemoptysis, sputum production and wheezing. Cardiovascular: Positive for chest pain. Negative for palpitations, orthopnea, claudication, PND and near-syncope. Gastrointestinal: Negative for abdominal pain, nausea and vomiting. Genitourinary: Negative for dysuria and flank pain. Musculoskeletal: Negative for back pain and myalgias. Skin: Negative for color change and rash. Neurological: Negative for dizziness, syncope, weakness, numbness and headaches. Vitals:    05/24/19 2213 05/24/19 2234 05/24/19 2235   BP: 110/69 119/58    Pulse: 78 73    Resp: 20 11    Temp: 98 °F (36.7 °C)     SpO2: 95%  96%   Weight: 97.5 kg (215 lb)     Height: 5' 1.5\" (1.562 m)              Physical Exam   Constitutional: She is oriented to person, place, and time. She appears well-developed and well-nourished. Well appearing and in NAD. HENT:   Head: Normocephalic. MMM. Eyes: Pupils are equal, round, and reactive to light. Conjunctivae and EOM are normal.   Neck: No JVD present. No tracheal deviation present. Cardiovascular: Normal rate, regular rhythm and normal heart sounds. RRR. Pulses 2+ and equal bilaterally. Pulmonary/Chest: Effort normal and breath sounds normal.   CTAB. No wheezes, rhonchi, or rales. Abdominal: Soft. Bowel sounds are normal.   Obese. Soft, NTND. No rebound or guarding. Musculoskeletal: Normal range of motion. No LE edema. No calf TTP. Neurological: She is alert and oriented to person, place, and time. No cranial nerve deficit. Strength 5/5 throughout. Normal sensory. Skin: Skin is warm and dry. No rash. Nursing note and vitals reviewed.        MDM  Number of Diagnoses or Management Options  NSTEMI (non-ST elevated myocardial infarction) [facility]): new and requires workup  Diagnosis management comments: Trop 0.11 (lab). ECG w/ no acute ST changes. Cards consulted. Dr. Yola Bacon on call. States provider will present to bedside for evaluation. Will start on Heparin at this time. Amount and/or Complexity of Data Reviewed  Clinical lab tests: ordered and reviewed  Tests in the radiology section of CPT®: ordered and reviewed  Tests in the medicine section of CPT®: ordered and reviewed  Review and summarize past medical records: yes  Discuss the patient with other providers: yes  Independent visualization of images, tracings, or specimens: yes    Risk of Complications, Morbidity, and/or Mortality  Presenting problems: moderate  Diagnostic procedures: moderate  Management options: moderate    Patient Progress  Patient progress: stable    ED Course as of May 24 2343   Fri May 24, 2019   2332 CXR No evidence of acute pulmonary disease. [DF]      ED Course User Index  [DF] Odalis Brown MD       EKG  Date/Time: 5/24/2019 11:48 PM  Performed by: Odalis Brown MD  Authorized by:  Odalis Brown MD     ECG reviewed by ED Physician in the absence of a cardiologist: yes    Rate:     ECG rate:  74    ECG rate assessment: normal    Rhythm:     Rhythm: sinus rhythm    Ectopy:     Ectopy: none    QRS:     QRS axis:  Normal    QRS intervals:  Normal  Conduction:     Conduction: normal    ST segments:     ST segments:  Normal  T waves:     T waves: normal          Results Include:    Recent Results (from the past 24 hour(s))   EKG, 12 LEAD, INITIAL    Collection Time: 05/24/19 10:05 PM   Result Value Ref Range    Ventricular Rate 74 BPM    Atrial Rate 74 BPM    P-R Interval 194 ms    QRS Duration 86 ms    Q-T Interval 408 ms    QTC Calculation (Bezet) 452 ms    Calculated P Axis 64 degrees    Calculated R Axis 28 degrees    Calculated T Axis 40 degrees    Diagnosis       Normal sinus rhythm  Low voltage QRS  Borderline ECG  When compared with ECG of 11-JAN-2008 20:53,  Questionable change in QRS axis     POC TROPONIN-I    Collection Time: 05/24/19 10:15 PM   Result Value Ref Range    Troponin-I (POC) 0.04 0.02 - 0.05 ng/ml   CBC WITH AUTOMATED DIFF    Collection Time: 05/24/19 10:16 PM   Result Value Ref Range    WBC 11.7 (H) 4.3 - 11.1 K/uL    RBC 5.26 (H) 4.05 - 5.2 M/uL    HGB 16.2 (H) 11.7 - 15.4 g/dL    HCT 47.2 (H) 35.8 - 46.3 %    MCV 89.7 79.6 - 97.8 FL    MCH 30.8 26.1 - 32.9 PG    MCHC 34.3 31.4 - 35.0 g/dL    RDW 13.6 11.9 - 14.6 %    PLATELET 336 686 - 216 K/uL    MPV 10.7 9.4 - 12.3 FL    ABSOLUTE NRBC 0.00 0.0 - 0.2 K/uL    DF AUTOMATED      NEUTROPHILS 58 43 - 78 %    LYMPHOCYTES 32 13 - 44 %    MONOCYTES 6 4.0 - 12.0 %    EOSINOPHILS 2 0.5 - 7.8 %    BASOPHILS 1 0.0 - 2.0 %    IMMATURE GRANULOCYTES 0 0.0 - 5.0 %    ABS. NEUTROPHILS 6.8 1.7 - 8.2 K/UL    ABS. LYMPHOCYTES 3.7 0.5 - 4.6 K/UL    ABS. MONOCYTES 0.8 0.1 - 1.3 K/UL    ABS. EOSINOPHILS 0.3 0.0 - 0.8 K/UL    ABS. BASOPHILS 0.1 0.0 - 0.2 K/UL    ABS. IMM. GRANS. 0.0 0.0 - 0.5 K/UL   METABOLIC PANEL, COMPREHENSIVE    Collection Time: 05/24/19 10:16 PM   Result Value Ref Range    Sodium 146 (H) 136 - 145 mmol/L    Potassium 3.3 (L) 3.5 - 5.1 mmol/L    Chloride 110 (H) 98 - 107 mmol/L    CO2 28 21 - 32 mmol/L    Anion gap 8 7 - 16 mmol/L    Glucose 141 (H) 65 - 100 mg/dL    BUN 20 6 - 23 MG/DL    Creatinine 1.03 (H) 0.6 - 1.0 MG/DL    GFR est AA >60 >60 ml/min/1.73m2    GFR est non-AA 59 (L) >60 ml/min/1.73m2    Calcium 8.9 8.3 - 10.4 MG/DL    Bilirubin, total 0.3 0.2 - 1.1 MG/DL    ALT (SGPT) 18 12 - 65 U/L    AST (SGOT) 11 (L) 15 - 37 U/L    Alk.  phosphatase 71 50 - 136 U/L    Protein, total 7.1 6.3 - 8.2 g/dL    Albumin 3.5 3.5 - 5.0 g/dL    Globulin 3.6 (H) 2.3 - 3.5 g/dL    A-G Ratio 1.0 (L) 1.2 - 3.5     TROPONIN I    Collection Time: 05/24/19 10:16 PM   Result Value Ref Range    Troponin-I, Qt. 0.11 (HH) 0.02 - 0.05 NG/ML         Gulshan Childs MD; 5/24/2019 @11:50 PM Voice dictation software was used during the making of this note. This software is not perfect and grammatical and other typographical errors may be present.   This note has not been proofread for errors.  ===================================================================

## 2019-05-25 NOTE — PROGRESS NOTES
TRANSFER - IN REPORT:    Verbal report received from Juan Miguel Lerma on [facility] being received from [facility] for routine post - op      Report consisted of patients Situation, Background, Assessment and Recommendations(SBAR). Information from the following report(s) SBAR, OR Summary, Procedure Summary, Accordion, Recent Results, Med Rec Status and Cardiac Rhythm NSR was reviewed with the receiving nurse. Opportunity for questions and clarification was provided. Assessment completed upon patients arrival to unit and care assumed.

## 2019-05-25 NOTE — PROGRESS NOTES
Phone call placed to Lab about this patient's morning lab draws including timed PTT. Tech stated they would be up to the floor to draw them.

## 2019-05-25 NOTE — PROGRESS NOTES
Verbal bedside report given to kaleigh Moyer Chi, RN. Patient's situation, background, assessment and recommendations provided. Opportunity for questions provided. Oncoming RN assumed care of patient. R radial site visualized.

## 2019-05-25 NOTE — PROGRESS NOTES
Physical Exam   Skin:             MD at bedside, Manual compression applied by MD with BP cuff. Verbal order for manual complression to stay on and remain on and titrate down. Bp cuff placed at 80mmHG will titrate down to 60mmHG after 20 mins and follow that until BP cuff released.

## 2019-05-26 VITALS
BODY MASS INDEX: 41.04 KG/M2 | SYSTOLIC BLOOD PRESSURE: 142 MMHG | OXYGEN SATURATION: 98 % | WEIGHT: 217.4 LBS | RESPIRATION RATE: 16 BRPM | HEIGHT: 61 IN | DIASTOLIC BLOOD PRESSURE: 73 MMHG | HEART RATE: 75 BPM | TEMPERATURE: 98.1 F

## 2019-05-26 LAB
ACT BLD: 296 SECS (ref 70–128)
ANION GAP SERPL CALC-SCNC: 9 MMOL/L (ref 7–16)
ATRIAL RATE: 64 BPM
BASOPHILS # BLD: 0 K/UL (ref 0–0.2)
BASOPHILS NFR BLD: 1 % (ref 0–2)
BUN SERPL-MCNC: 10 MG/DL (ref 6–23)
CALCIUM SERPL-MCNC: 9 MG/DL (ref 8.3–10.4)
CALCULATED P AXIS, ECG09: 66 DEGREES
CALCULATED R AXIS, ECG10: 73 DEGREES
CALCULATED T AXIS, ECG11: 68 DEGREES
CHLORIDE SERPL-SCNC: 108 MMOL/L (ref 98–107)
CHOLEST SERPL-MCNC: 220 MG/DL
CO2 SERPL-SCNC: 26 MMOL/L (ref 21–32)
CREAT SERPL-MCNC: 0.73 MG/DL (ref 0.6–1)
DIAGNOSIS, 93000: NORMAL
DIFFERENTIAL METHOD BLD: NORMAL
EOSINOPHIL # BLD: 0.2 K/UL (ref 0–0.8)
EOSINOPHIL NFR BLD: 2 % (ref 0.5–7.8)
ERYTHROCYTE [DISTWIDTH] IN BLOOD BY AUTOMATED COUNT: 13.6 % (ref 11.9–14.6)
GLUCOSE BLD STRIP.AUTO-MCNC: 119 MG/DL (ref 65–100)
GLUCOSE SERPL-MCNC: 118 MG/DL (ref 65–100)
HCT VFR BLD AUTO: 44 % (ref 35.8–46.3)
HDLC SERPL-MCNC: 39 MG/DL (ref 40–60)
HDLC SERPL: 5.6 {RATIO}
HGB BLD-MCNC: 14.8 G/DL (ref 11.7–15.4)
IMM GRANULOCYTES # BLD AUTO: 0 K/UL (ref 0–0.5)
IMM GRANULOCYTES NFR BLD AUTO: 0 % (ref 0–5)
LDLC SERPL CALC-MCNC: 141.6 MG/DL
LIPID PROFILE,FLP: ABNORMAL
LYMPHOCYTES # BLD: 2.1 K/UL (ref 0.5–4.6)
LYMPHOCYTES NFR BLD: 25 % (ref 13–44)
MAGNESIUM SERPL-MCNC: 2.2 MG/DL (ref 1.8–2.4)
MCH RBC QN AUTO: 30.6 PG (ref 26.1–32.9)
MCHC RBC AUTO-ENTMCNC: 33.6 G/DL (ref 31.4–35)
MCV RBC AUTO: 91.1 FL (ref 79.6–97.8)
MONOCYTES # BLD: 0.5 K/UL (ref 0.1–1.3)
MONOCYTES NFR BLD: 6 % (ref 4–12)
NEUTS SEG # BLD: 5.5 K/UL (ref 1.7–8.2)
NEUTS SEG NFR BLD: 66 % (ref 43–78)
NRBC # BLD: 0 K/UL (ref 0–0.2)
P-R INTERVAL, ECG05: 198 MS
PLATELET # BLD AUTO: 151 K/UL (ref 150–450)
PMV BLD AUTO: 10.8 FL (ref 9.4–12.3)
POTASSIUM SERPL-SCNC: 3.3 MMOL/L (ref 3.5–5.1)
Q-T INTERVAL, ECG07: 428 MS
QRS DURATION, ECG06: 94 MS
QTC CALCULATION (BEZET), ECG08: 441 MS
RBC # BLD AUTO: 4.83 M/UL (ref 4.05–5.2)
SODIUM SERPL-SCNC: 143 MMOL/L (ref 136–145)
TRIGL SERPL-MCNC: 197 MG/DL (ref 35–150)
VENTRICULAR RATE, ECG03: 64 BPM
VLDLC SERPL CALC-MCNC: 39.4 MG/DL (ref 6–23)
WBC # BLD AUTO: 8.3 K/UL (ref 4.3–11.1)

## 2019-05-26 PROCEDURE — 93005 ELECTROCARDIOGRAM TRACING: CPT | Performed by: INTERNAL MEDICINE

## 2019-05-26 PROCEDURE — 80048 BASIC METABOLIC PNL TOTAL CA: CPT

## 2019-05-26 PROCEDURE — 85027 COMPLETE CBC AUTOMATED: CPT

## 2019-05-26 PROCEDURE — 74011250637 HC RX REV CODE- 250/637: Performed by: PHYSICIAN ASSISTANT

## 2019-05-26 PROCEDURE — 74011250637 HC RX REV CODE- 250/637: Performed by: NURSE PRACTITIONER

## 2019-05-26 PROCEDURE — 83735 ASSAY OF MAGNESIUM: CPT

## 2019-05-26 PROCEDURE — 80061 LIPID PANEL: CPT

## 2019-05-26 PROCEDURE — 74011250637 HC RX REV CODE- 250/637: Performed by: INTERNAL MEDICINE

## 2019-05-26 PROCEDURE — 82962 GLUCOSE BLOOD TEST: CPT

## 2019-05-26 PROCEDURE — 36415 COLL VENOUS BLD VENIPUNCTURE: CPT

## 2019-05-26 RX ORDER — POTASSIUM CHLORIDE 20 MEQ/1
40 TABLET, EXTENDED RELEASE ORAL
Status: COMPLETED | OUTPATIENT
Start: 2019-05-26 | End: 2019-05-26

## 2019-05-26 RX ORDER — CLOPIDOGREL BISULFATE 75 MG/1
75 TABLET ORAL DAILY
Status: DISCONTINUED | OUTPATIENT
Start: 2019-05-27 | End: 2019-05-26 | Stop reason: HOSPADM

## 2019-05-26 RX ORDER — CLOPIDOGREL BISULFATE 75 MG/1
75 TABLET ORAL DAILY
Qty: 30 TAB | Refills: 1 | Status: SHIPPED | OUTPATIENT
Start: 2019-05-27 | End: 2019-05-26 | Stop reason: SDUPTHER

## 2019-05-26 RX ORDER — CLOPIDOGREL BISULFATE 75 MG/1
300 TABLET ORAL ONCE
Status: COMPLETED | OUTPATIENT
Start: 2019-05-26 | End: 2019-05-26

## 2019-05-26 RX ORDER — CLOPIDOGREL BISULFATE 75 MG/1
75 TABLET ORAL DAILY
Qty: 90 TAB | Refills: 3 | Status: SHIPPED | OUTPATIENT
Start: 2019-05-27 | End: 2019-05-27 | Stop reason: SDUPTHER

## 2019-05-26 RX ADMIN — LORATADINE 10 MG: 10 TABLET ORAL at 08:10

## 2019-05-26 RX ADMIN — Medication 10 ML: at 05:40

## 2019-05-26 RX ADMIN — CLOPIDOGREL BISULFATE 300 MG: 75 TABLET ORAL at 09:20

## 2019-05-26 RX ADMIN — POTASSIUM CHLORIDE 40 MEQ: 20 TABLET, EXTENDED RELEASE ORAL at 08:09

## 2019-05-26 RX ADMIN — PANTOPRAZOLE SODIUM 40 MG: 40 TABLET, DELAYED RELEASE ORAL at 08:09

## 2019-05-26 RX ADMIN — ASPIRIN 81 MG: 81 TABLET, COATED ORAL at 08:09

## 2019-05-26 RX ADMIN — TICAGRELOR 90 MG: 90 TABLET ORAL at 05:39

## 2019-05-26 NOTE — PROGRESS NOTES
Discharge instructions reviewed with patient. Prescriptions given for plavix and med info sheets provided for all new medications. Opportunity for questions provided. Patient voiced understanding of all discharge instructions.      Plavix load 300 mg given prior to d/c  IV and monitor removed

## 2019-05-26 NOTE — DISCHARGE SUMMARY
[facility] Cardiology Discharge Summary     Patient ID:  Poncho Aragon  [id number]  72 y.o.  1960    Admit date: 5/24/2019    Discharge date:  5/26/2019    Admitting Physician: Toya Cerda MD     Discharge Physician: Dr. Ivana Momin    Admission Diagnoses: NSTEMI (non-ST elevated myocardial infarction) [facility]) [I21.4]    Discharge Diagnoses:    Diagnosis    NSTEMI (non-ST elevated myocardial infarction)     Type 2 diabetes mellitus     Stress at home    GERD (gastroesophageal reflux disease)    Tobacco abuse    JONNY (obstructive sleep apnea)    COPD (chronic obstructive pulmonary disease)     HLD (hyperlipidemia)    Obesity    Anxiety    HTN (hypertension), benign       Cardiology Procedures this admission:  Left heart catheterization with PCI, echo   Consults: None    Hospital Course: Patient presented to the ED with c/o CP and SOB. She was evaluated by Dr. Brooklynn Mcgraw and referred for [facility]. She underwent LHC on 5/25/19. Patient underwent cardiac catheterization by Dr. Ivana Momin. Patient was found to have a 99% stenosis of the mid->distalRCA that was stented with a 3.0 x 22 Isra CELENA and a 3.0 x 15 Isra CELENA with 0% residual stenosis. Patient tolerated the procedure well and was taken to the telemetry floor for recovery. Echo showed    -  Left ventricle: Systolic function was normal. Ejection fraction was  estimated in the range of 55 % to 60 %. There were no regional wall motion  abnormalities. -  Inferior vena cava, hepatic veins: The respirophasic change in diameter   was  more than 50%. The following morning patient was up feeling well without any complaints of chest pain or shortness of breath. Patient's right radial cath site was clean, dry and intact without hematoma or bruit though had been swollen proximally to radially access. Patient's labs were stable w K 3.3 and replaced prior to discharge.  Patient was seen and examined by Dr. Ivana Momin and determined stable and ready for discharge. Patient was instructed on the importance of medication compliance including taking Aspirin and plavix (changed from brilinta due to difficulty affording meds) everyday without missing a dose. After receiving drug eluting stents, the patient will remain on dual anti-platelet therapy for 1 year. For maximized medical therapy for CAD, patient will continue BB and statin as well, no ACEi due to previous intolerance. The patient will follow up with [facility]iology w Dr Jose Troncoso (we will call pt with appt) and has been referred to cardiac rehab. Resume glucophage on Tuesday May 28. Discussed with the patient importance of diet and exercise to prevent further cardiovascular disease. Highly encouraged to decrease and stop tobacco use. DISPOSITION: The patient is being discharged home in stable condition on a low saturated fat, low cholesterol and low salt diet. The patient is instructed to advance activities as tolerated to the limit of fatigue or shortness of breath. The patient is instructed to avoid all heavy lifting, straining, stooping or squatting for 3-5 days. The patient is instructed to watch the cath site for bleeding/oozing; if seen, the patient is instructed to apply firm pressure with a clean cloth and call [facility] at [phone]. The patient is instructed to watch for signs of infection which include: increasing area of redness, fever/hot to touch or purulent drainage at the catheterization site. The patient is instructed not to soak in a bathtub for 7-10 days, but is cleared to shower. The patient is instructed to call the office or return to the ER for immediate evaluation for any shortness of breath or chest pain not relieved by NTG.         Discharge Exam:   Visit Vitals  /81   Pulse 64   Temp 97.5 °F (36.4 °C)   Resp 18   Ht 5' 1\" (1.549 m)   Wt 98.7 kg (217 lb 11.2 oz)   SpO2 98%   BMI 41.13 kg/m²       Patient has been seen by Dr. Feliberto Yin: see his progress note for exam details. Recent Results (from the past 24 hour(s))   EKG, 12 LEAD, INITIAL    Collection Time: 05/24/19 10:05 PM   Result Value Ref Range    Ventricular Rate 74 BPM    Atrial Rate 74 BPM    P-R Interval 194 ms    QRS Duration 86 ms    Q-T Interval 408 ms    QTC Calculation (Bezet) 452 ms    Calculated P Axis 64 degrees    Calculated R Axis 28 degrees    Calculated T Axis 40 degrees    Diagnosis       Normal sinus rhythm  Low voltage QRS  Borderline ECG  When compared with ECG of 11-JAN-2008 20:53,  Questionable change in QRS axis  Confirmed by ST DAY YSOT MD (), LUCA JOHNSON ([id number]) on 5/25/2019 6:23:11 PM     POC TROPONIN-I    Collection Time: 05/24/19 10:15 PM   Result Value Ref Range    Troponin-I (POC) 0.04 0.02 - 0.05 ng/ml   CBC WITH AUTOMATED DIFF    Collection Time: 05/24/19 10:16 PM   Result Value Ref Range    WBC 11.7 (H) 4.3 - 11.1 K/uL    RBC 5.26 (H) 4.05 - 5.2 M/uL    HGB 16.2 (H) 11.7 - 15.4 g/dL    HCT 47.2 (H) 35.8 - 46.3 %    MCV 89.7 79.6 - 97.8 FL    MCH 30.8 26.1 - 32.9 PG    MCHC 34.3 31.4 - 35.0 g/dL    RDW 13.6 11.9 - 14.6 %    PLATELET 457 129 - 412 K/uL    MPV 10.7 9.4 - 12.3 FL    ABSOLUTE NRBC 0.00 0.0 - 0.2 K/uL    DF AUTOMATED      NEUTROPHILS 58 43 - 78 %    LYMPHOCYTES 32 13 - 44 %    MONOCYTES 6 4.0 - 12.0 %    EOSINOPHILS 2 0.5 - 7.8 %    BASOPHILS 1 0.0 - 2.0 %    IMMATURE GRANULOCYTES 0 0.0 - 5.0 %    ABS. NEUTROPHILS 6.8 1.7 - 8.2 K/UL    ABS. LYMPHOCYTES 3.7 0.5 - 4.6 K/UL    ABS. MONOCYTES 0.8 0.1 - 1.3 K/UL    ABS. EOSINOPHILS 0.3 0.0 - 0.8 K/UL    ABS. BASOPHILS 0.1 0.0 - 0.2 K/UL    ABS. IMM.  GRANS. 0.0 0.0 - 0.5 K/UL   METABOLIC PANEL, COMPREHENSIVE    Collection Time: 05/24/19 10:16 PM   Result Value Ref Range    Sodium 146 (H) 136 - 145 mmol/L    Potassium 3.3 (L) 3.5 - 5.1 mmol/L    Chloride 110 (H) 98 - 107 mmol/L    CO2 28 21 - 32 mmol/L    Anion gap 8 7 - 16 mmol/L    Glucose 141 (H) 65 - 100 mg/dL    BUN 20 6 - 23 MG/DL    Creatinine 1.03 (H) 0.6 - 1.0 MG/DL GFR est AA >60 >60 ml/min/1.73m2    GFR est non-AA 59 (L) >60 ml/min/1.73m2    Calcium 8.9 8.3 - 10.4 MG/DL    Bilirubin, total 0.3 0.2 - 1.1 MG/DL    ALT (SGPT) 18 12 - 65 U/L    AST (SGOT) 11 (L) 15 - 37 U/L    Alk. phosphatase 71 50 - 136 U/L    Protein, total 7.1 6.3 - 8.2 g/dL    Albumin 3.5 3.5 - 5.0 g/dL    Globulin 3.6 (H) 2.3 - 3.5 g/dL    A-G Ratio 1.0 (L) 1.2 - 3.5     TROPONIN I    Collection Time: 05/24/19 10:16 PM   Result Value Ref Range    Troponin-I, Qt. 0.11 (HH) 0.02 - 0.05 NG/ML   BNP    Collection Time: 05/24/19 10:16 PM   Result Value Ref Range    BNP 24 (H) 0 pg/mL   EKG, 12 LEAD, SUBSEQUENT    Collection Time: 05/24/19 11:42 PM   Result Value Ref Range    Ventricular Rate 78 BPM    Atrial Rate 78 BPM    P-R Interval 192 ms    QRS Duration 92 ms    Q-T Interval 408 ms    QTC Calculation (Bezet) 465 ms    Calculated P Axis 65 degrees    Calculated R Axis 24 degrees    Calculated T Axis 44 degrees    Diagnosis       !! AGE AND GENDER SPECIFIC ECG ANALYSIS !!   Normal sinus rhythm  Low voltage QRS  Borderline ECG  When compared with ECG of 24-MAY-2019 22:05,  No significant change was found  Confirmed by ST DAY YOST MD (), LUCA JOHNSON ([id number]) on 5/25/2019 6:26:45 PM     TROPONIN I    Collection Time: 05/25/19 12:14 AM   Result Value Ref Range    Troponin-I, Qt. 0.11 (HH) 0.02 - 0.05 NG/ML   PROTHROMBIN TIME + INR    Collection Time: 05/25/19 12:14 AM   Result Value Ref Range    Prothrombin time 12.9 11.7 - 14.5 sec    INR 1.0     PTT    Collection Time: 05/25/19 12:14 AM   Result Value Ref Range    aPTT 35.1 24.7 - 39.8 SEC   POC TROPONIN-I    Collection Time: 05/25/19 12:47 AM   Result Value Ref Range    Troponin-I (POC) 0.02 0.02 - 0.05 ng/ml   GLUCOSE, POC    Collection Time: 05/25/19  6:06 AM   Result Value Ref Range    Glucose (POC) 108 (H) 65 - 100 mg/dL   LIPID PANEL    Collection Time: 05/25/19  7:32 AM   Result Value Ref Range    LIPID PROFILE          Cholesterol, total 243 (H) <200 MG/DL    Triglyceride 220 (H) 35 - 150 MG/DL    HDL Cholesterol 34 (L) 40 - 60 MG/DL    LDL, calculated 165 (H) <100 MG/DL    VLDL, calculated 44 (H) 6.0 - 23.0 MG/DL    CHOL/HDL Ratio 7.1     METABOLIC PANEL, BASIC    Collection Time: 05/25/19  7:32 AM   Result Value Ref Range    Sodium 144 136 - 145 mmol/L    Potassium 3.4 (L) 3.5 - 5.1 mmol/L    Chloride 111 (H) 98 - 107 mmol/L    CO2 26 21 - 32 mmol/L    Anion gap 7 7 - 16 mmol/L    Glucose 100 65 - 100 mg/dL    BUN 19 6 - 23 MG/DL    Creatinine 0.80 0.6 - 1.0 MG/DL    GFR est AA >60 >60 ml/min/1.73m2    GFR est non-AA >60 >60 ml/min/1.73m2    Calcium 9.0 8.3 - 10.4 MG/DL   CBC W/O DIFF    Collection Time: 05/25/19  7:32 AM   Result Value Ref Range    WBC 10.4 4.3 - 11.1 K/uL    RBC 5.13 4.05 - 5.2 M/uL    HGB 15.8 (H) 11.7 - 15.4 g/dL    HCT 46.7 (H) 35.8 - 46.3 %    MCV 91.0 79.6 - 97.8 FL    MCH 30.8 26.1 - 32.9 PG    MCHC 33.8 31.4 - 35.0 g/dL    RDW 13.6 11.9 - 14.6 %    PLATELET 056 (L) 889 - 450 K/uL    MPV 10.7 9.4 - 12.3 FL    ABSOLUTE NRBC 0.00 0.0 - 0.2 K/uL   MAGNESIUM    Collection Time: 05/25/19  7:32 AM   Result Value Ref Range    Magnesium 2.1 1.8 - 2.4 mg/dL   PTT    Collection Time: 05/25/19  7:32 AM   Result Value Ref Range    aPTT 66.4 (H) 24.7 - 39.8 SEC   TROPONIN I    Collection Time: 05/25/19  7:32 AM   Result Value Ref Range    Troponin-I, Qt. 0.10 (HH) 0.02 - 0.05 NG/ML   GLUCOSE, POC    Collection Time: 05/25/19 11:24 AM   Result Value Ref Range    Glucose (POC) 105 (H) 65 - 100 mg/dL   EKG, 12 LEAD, INITIAL    Collection Time: 05/25/19  2:31 PM   Result Value Ref Range    Ventricular Rate 64 BPM    Atrial Rate 64 BPM    P-R Interval 198 ms    QRS Duration 90 ms    Q-T Interval 426 ms    QTC Calculation (Bezet) 439 ms    Calculated P Axis 51 degrees    Calculated R Axis 49 degrees    Calculated T Axis 63 degrees    Diagnosis       Normal sinus rhythm  Low voltage QRS  Borderline ECG  When compared with ECG of 24-MAY-2019 23:42,  No significant change was found  Confirmed by [facility] (), LUCA JOHNSON ([id number]) on 5/25/2019 8:36:25 PM     GLUCOSE, POC    Collection Time: 05/25/19  4:11 PM   Result Value Ref Range    Glucose (POC) 124 (H) 65 - 100 mg/dL         Patient Instructions:   Current Discharge Medication List      START taking these medications    Details   clopidogrel (PLAVIX) 75 mg tab Take 1 Tab by mouth daily. Qty: 30 Tab, Refills: 1         CONTINUE these medications which have NOT CHANGED    Details   pravastatin (PRAVACHOL) 40 mg tablet Take 1 Tab by mouth nightly. Qty: 90 Tab, Refills: 3    Associated Diagnoses: Type 2 diabetes mellitus with complication, without long-term current use of insulin ([facility])      atenolol-chlorthalidone (TENORETIC) 100-25 mg per tablet Take 1 Tab by mouth daily. Qty: 90 Tab, Refills: 3    Associated Diagnoses: HTN (hypertension), benign; Elevated blood sugar; Type 2 diabetes mellitus with complication, without long-term current use of insulin ([facility])      metFORMIN (GLUCOPHAGE) 500 mg tablet Take 1 Tab by mouth two (2) times daily (with meals). Qty: 180 Tab, Refills: 5    Associated Diagnoses: Elevated blood sugar; Type 2 diabetes mellitus with complication, without long-term current use of insulin ([facility])      umeclidinium-vilanterol (ANORO ELLIPTA) 62.5-25 mcg/actuation inhaler Take 1 Puff by inhalation daily. Qty: 1 Inhaler, Refills: 11      PROAIR HFA 90 mcg/actuation inhaler Take 2 Puffs by inhalation every four (4) hours as needed for Wheezing. Qty: 1 Inhaler, Refills: 5    Associated Diagnoses: Chronic obstructive pulmonary disease, unspecified COPD type ([facility])      aspirin delayed-release 81 mg tablet Take 1 Tab by mouth daily. Qty: 90 Tab, Refills: 3    Associated Diagnoses: Type 2 diabetes mellitus with complication, without long-term current use of insulin ([facility])      nitroglycerin (NITROSTAT) 0.4 mg SL tablet 1 Tab by SubLINGual route every five (5) minutes as needed for Chest Pain.   Qty: 1 Bottle, Refills: 3      cetirizine (ZYRTEC) 10 mg tablet Take  by mouth. CALCIUM CARB/VIT D2/MINERALS (CALTRATE PLUS PO) Take 1 Tab by mouth two (2) times a day. !! miscellaneous medical supply misc Glucose test strips, test up to 3 times daily, icd-10 E11.8  Qty: 100 Each, Refills: 11      !! miscellaneous medical supply [facility] Lancets, use for up to three times daily glucose testing, Icd-10 E11.8  Qty: 100 Each, Refills: 11      !! miscellaneous medical supply misc Glucose Test Meter, ICD-10 E11.8  Qty: 1 Each, Refills: 0    Associated Diagnoses: Elevated blood sugar; Type 2 diabetes mellitus with complication, without long-term current use of insulin ([facility]       ! ! - Potential duplicate medications found. Please discuss with provider.       STOP taking these medications       esomeprazole magnesium (NEXIUM 24HR) 22.3 mg cpDR Comments:   Reason for Stopping:

## 2019-05-26 NOTE — PROGRESS NOTES
Bedside and Verbal shift change report given to self (oncoming nurse) by Johanna Schmidt RN (offgoing nurse).  Report included the following information SBAR, Kardex, MAR, Recent Results and Cardiac Rhythm SR.

## 2019-05-26 NOTE — PROGRESS NOTES
Verbal bedside report given to [facility], oncoming RN. Patient's situation, background, assessment and recommendations provided. Opportunity for questions provided. Oncoming RN assumed care of patient.

## 2019-05-26 NOTE — DISCHARGE INSTRUCTIONS
Resume glucophage on Tuesday May 28    The patient is being discharged home in stable condition on a low saturated fat, low cholesterol and low salt diet. The patient is instructed to advance activities as tolerated to the limit of fatigue or shortness of breath. The patient is instructed to avoid all heavy lifting, straining, stooping or squatting for 3-5 days. The patient is instructed to watch the cath site for bleeding/oozing; if seen, the patient is instructed to apply firm pressure with a clean cloth and call [address] [facility] Cardiology at [phone]. The patient is instructed to watch for signs of infection which include: increasing area of redness, fever/hot to touch or purulent drainage at the catheterization site. The patient is instructed not to soak in a bathtub for 7-10 days, but is cleared to shower. The patient is instructed to call the office or return to the ER for immediate evaluation for any shortness of breath or chest pain not relieved by NTG.      Patient Education        Percutaneous Coronary Intervention: What to Expect at Home  Your Recovery    Percutaneous coronary intervention (PCI) is the name for procedures that are used to open a narrowed or blocked coronary artery. The two most common PCI procedures are coronary angioplasty and coronary stent placement. Your groin or arm may have a bruise and feel sore for a day or two after a percutaneous coronary intervention (PCI). You can do light activities around the house, but nothing strenuous for several days. This care sheet gives you a general idea about how long it will take for you to recover. But each person recovers at a different pace. Follow the steps below to get better as quickly as possible. How can you care for yourself at home? Activity    · If the doctor gave you a sedative:  ? For 24 hours, don't do anything that requires attention to detail.  It takes time for the medicine's effects to completely wear off.  ? For your safety, do not drive or operate any machinery that could be dangerous. Wait until the medicine wears off and you can think clearly and react easily.     · Do not do strenuous exercise and do not lift, pull, or push anything heavy until your doctor says it is okay. This may be for a day or two. You can walk around the house and do light activity, such as cooking.     · If the catheter was placed in your groin, try not to walk up stairs for the first couple of days.     · If the catheter was placed in your arm near your wrist, do not bend your wrist deeply for the first couple of days. Be careful using your hand to get into and out of a chair or bed.     · Carry your stent identification card with you at all times.     · If your doctor recommends it, get more exercise. Walking is a good choice. Bit by bit, increase the amount you walk every day. Try for at least 30 minutes on most days of the week. Diet    · Drink plenty of fluids to help your body flush out the dye. If you have kidney, heart, or liver disease and have to limit fluids, talk with your doctor before you increase the amount of fluids you drink.     · Keep eating a heart-healthy diet that has lots of fruits, vegetables, and whole grains. If you have not been eating this way, talk to your doctor. You also may want to talk to a dietitian. This expert can help you to learn about healthy foods and plan meals. Medicines    · Your doctor will tell you if and when you can restart your medicines. He or she will also give you instructions about taking any new medicines.     · If you take blood thinners, such as warfarin (Coumadin), clopidogrel (Plavix), or aspirin, be sure to talk to your doctor. He or she will tell you if and when to start taking those medicines again. Make sure that you understand exactly what your doctor wants you to do.     · Your doctor will prescribe blood-thinning medicines.  You will likely take aspirin plus another antiplatelet, such as clopidogrel (Plavix). It is very important that you take these medicines exactly as directed. These medicines help keep the coronary artery open and reduce your risk of a heart attack.     · Call your doctor if you think you are having a problem with your medicine.    Care of the catheter site    · For 1 or 2 days, keep a bandage over the spot where the catheter was inserted. The bandage probably will fall off in this time.     · Put ice or a cold pack on the area for 10 to 20 minutes at a time to help with soreness or swelling. Put a thin cloth between the ice and your skin.     · You may shower 24 to 48 hours after the procedure, if your doctor okays it. Pat the incision dry.     · Do not soak the catheter site until it is healed. Don't take a bath for 1 week, or until your doctor tells you it is okay.     · If you are bleeding, lie down and press on the area for 15 minutes to try to make it stop. If the bleeding does not stop, call your doctor or seek immediate medical care. Follow-up care is a key part of your treatment and safety. Be sure to make and go to all appointments, and call your doctor if you are having problems. It's also a good idea to know your test results and keep a list of the medicines you take. When should you call for help? Call 911 anytime you think you may need emergency care. For example, call if:    · You passed out (lost consciousness).     · You have severe trouble breathing.     · You have sudden chest pain and shortness of breath, or you cough up blood.     · You have symptoms of a heart attack, such as:  ? Chest pain or pressure. ? Sweating. ? Shortness of breath. ? Nausea or vomiting. ? Pain that spreads from the chest to the neck, jaw, or one or both shoulders or arms. ? Dizziness or lightheadedness. ? A fast or uneven pulse. After calling 911, chew 1 adult-strength aspirin. Wait for an ambulance.  Do not try to drive yourself.     · You have been diagnosed with angina, and you have angina symptoms that do not go away with rest or are not getting better within 5 minutes after you take one dose of nitroglycerin.    Call your doctor now or seek immediate medical care if:    · You are bleeding from the area where the catheter was put in your artery.     · You have a fast-growing, painful lump at the catheter site.     · You have signs of infection, such as:  ? Increased pain, swelling, warmth, or redness. ? Red streaks leading from the catheter site. ? Pus draining from the catheter site. ? A fever.     · Your leg or arm looks blue or feels cold, numb, or tingly.    Watch closely for changes in your health, and be sure to contact your doctor if you have any problems. Where can you learn more? Go to http://alanna-barry.info/. Enter L226 in the search box to learn more about \"Percutaneous Coronary Intervention: What to Expect at Home. \"  Current as of: July 22, 2018  Content Version: 11.9  © 9197-8683 [employer]. Care instructions adapted under license by [employer] (which disclaims liability or warranty for this information). If you have questions about a medical condition or this instruction, always ask your healthcare professional. [place] [address] any warranty or liability for your use of this information. Patient Education   Clopidogrel (By mouth)   Clopidogrel ([id number])  Helps prevent stroke, heart attack, and other heart problems. This medicine is a platelet inhibitor. Brand Name(s): Plavix   There may be other brand names for this medicine. When This Medicine Should Not Be Used: This medicine is not right for everyone. Do not use it if you had an allergic reaction to clopidogrel. How to Use This Medicine:   Tablet  · Your doctor will tell you how much medicine to use. Do not use more than directed. · This medicine should come with a Medication Guide.  Ask your pharmacist for a copy if you do not have one.  · Missed dose: Take a dose as soon as you remember. If it is almost time for your next dose, wait until then and take a regular dose. Do not take extra medicine to make up for a missed dose. · Store the medicine in a closed container at room temperature, away from heat, moisture, and direct light. Drugs and Foods to Avoid:   Ask your doctor or pharmacist before using any other medicine, including over-the-counter medicines, vitamins, and herbal products. · Some medicines can affect how clopidogrel works. Tell your doctor if you are using any of the following:   ¨ Esomeprazole, omeprazole, repaglinide, or warfarin  ¨ Medicine to treat depression  ¨ NSAID pain or arthritis medicine (including celecoxib, diclofenac, ibuprofen, or naproxen)  Warnings While Using This Medicine:   · Tell your doctor if you are pregnant or breastfeeding, or if you have bleeding problems, stomach ulcer or bleeding, a recent stroke, or have a history of bleeding problems. · This medicine can cause you to bleed and bruise more easily. Take precautions to avoid injury. Brush and floss your teeth gently, do not play rough sports, and be careful with sharp objects. Severe bleeding can be life-threatening. · This medicine may cause a rare but serious blood clotting condition called thrombotic thrombocytopenic purpura. · Make sure any doctor or dentist who treats you knows that you are using this medicine. Tell your doctor if you plan to have surgery or a dental procedure. · Do not stop using this medicine suddenly. Your doctor will need to slowly decrease your dose before you stop it completely. · Your doctor will check your progress and the effects of this medicine at regular visits. Keep all appointments. · Keep all medicine out of the reach of children. Never share your medicine with anyone.   Possible Side Effects While Using This Medicine:   Call your doctor right away if you notice any of these side effects:  · Allergic reaction: Itching or hives, swelling in your face or hands, swelling or tingling in your mouth or throat, chest tightness, trouble breathing  · Bloody or black, tarry stools  · Nosebleeds  · Pinpoint red or purple spots on your skin or in your mouth  · Problems with vision, speech, or walking  · Red or dark brown urine  · Seizures  · Severe stomach pain  · Trouble breathing, tiredness, fast heartbeat, yellow skin or eyes  · Unusual bleeding, bruising, or weakness  · Vomiting of blood or vomit that looks like coffee grounds  If you notice other side effects that you think are caused by this medicine, tell your doctor. Call your doctor for medical advice about side effects. You may report side effects to FDA at 4-357-VAS-0345  © 2017 [facility] Information is for End User's use only and may not be sold, redistributed or otherwise used for commercial purposes. The above information is an  only. It is not intended as medical advice for individual conditions or treatments. Talk to your doctor, nurse or pharmacist before following any medical regimen to see if it is safe and effective for you. Patient Education        Hematoma: Care Instructions  Your Care Instructions    A hematoma is a bad bruise. It happens when an injury causes blood to collect and pool under the skin. The pooling blood gives the skin a spongy, rubbery, lumpy feel. A hematoma usually is not a cause for concern. It is not the same thing as a blood clot in a vein, and it does not cause blood clots. Follow-up care is a key part of your treatment and safety. Be sure to make and go to all appointments, and call your doctor if you are having problems. It's also a good idea to know your test results and keep a list of the medicines you take. How can you care for yourself at home? · Rest and protect the bruised area. · Put ice or a cold pack on the area for 10 to 20 minutes at a time.   · Prop up the bruised area on a pillow when you ice it or anytime you sit or lie down during the next 3 days. Try to keep it above the level of your heart. This will help reduce swelling. · Wrapping the bruised area with an elastic bandage such as an Ace wrap will help decrease swelling. Don't wrap it too tightly, as this can cause more swelling below the affected area. · Be safe with medicines. Read and follow all instructions on the label. ? If the doctor gave you a prescription medicine for pain, take it as prescribed. ? If you are not taking a prescription pain medicine, ask your doctor if you can take an over-the-counter medicine. · Do not take two or more pain medicines at the same time unless the doctor told you to. Many pain medicines have acetaminophen, which is Tylenol. Too much acetaminophen (Tylenol) can be harmful. When should you call for help? Call your doctor now or seek immediate medical care if:    · You have signs of skin infection, such as:  ? Increased pain, swelling, warmth, or redness. ? Red streaks leading from the area. ? Pus draining from the area. ? A fever.    Watch closely for changes in your health, and be sure to contact your doctor if:    · The bruise lasts longer than 4 weeks.     · The bruise gets bigger or becomes more painful.     · You do not get better as expected. Where can you learn more? Go to http://alanna-barry.info/. Enter P911 in the search box to learn more about \"Hematoma: Care Instructions. \"  Current as of: September 23, 2018  Content Version: 11.9  © 0987-5133 [employer]. Care instructions adapted under license by [employer] (which disclaims liability or warranty for this information). If you have questions about a medical condition or this instruction, always ask your healthcare professional. [place] [address] any warranty or liability for your use of this information.          DISCHARGE SUMMARY from Nurse    PATIENT INSTRUCTIONS:    After general anesthesia or intravenous sedation, for 24 hours or while taking prescription Narcotics:  · Limit your activities  · Do not drive and operate hazardous machinery  · Do not make important personal or business decisions  · Do  not drink alcoholic beverages  · If you have not urinated within 8 hours after discharge, please contact your surgeon on call. Report the following to your surgeon:  · Excessive pain, swelling, redness or odor of or around the surgical area  · Temperature over 100.5  · Nausea and vomiting lasting longer than 4 hours or if unable to take medications  · Any signs of decreased circulation or nerve impairment to extremity: change in color, persistent  numbness, tingling, coldness or increase pain  · Any questions    What to do at Home:  Recommended activity: No lifting,  or Strenuous exercise for 5-7 days , or until cleared by cardiology    If you experience any of the following symptoms excess swelling in your right arm, shortness of breath or chest pain, please follow up with [facility]. *  Please give a list of your current medications to your Primary Care Provider. *  Please update this list whenever your medications are discontinued, doses are      changed, or new medications (including over-the-counter products) are added. *  Please carry medication information at all times in case of emergency situations. These are general instructions for a healthy lifestyle:    No smoking/ No tobacco products/ Avoid exposure to second hand smoke  Surgeon General's Warning:  Quitting smoking now greatly reduces serious risk to your health.     Obesity, smoking, and sedentary lifestyle greatly increases your risk for illness    A healthy diet, regular physical exercise & weight monitoring are important for maintaining a healthy lifestyle    You may be retaining fluid if you have a history of heart failure or if you experience any of the following symptoms: Weight gain of 3 pounds or more overnight or 5 pounds in a week, increased swelling in our hands or feet or shortness of breath while lying flat in bed. Please call your doctor as soon as you notice any of these symptoms; do not wait until your next office visit. Recognize signs and symptoms of STROKE:    F-face looks uneven    A-arms unable to move or move unevenly    S-speech slurred or non-existent    T-time-call 911 as soon as signs and symptoms begin-DO NOT go       Back to bed or wait to see if you get better-TIME IS BRAIN. Warning Signs of HEART ATTACK     Call 911 if you have these symptoms:   Chest discomfort. Most heart attacks involve discomfort in the center of the chest that lasts more than a few minutes, or that goes away and comes back. It can feel like uncomfortable pressure, squeezing, fullness, or pain.  Discomfort in other areas of the upper body. Symptoms can include pain or discomfort in one or both arms, the back, neck, jaw, or stomach.  Shortness of breath with or without chest discomfort.  Other signs may include breaking out in a cold sweat, nausea, or lightheadedness. Don't wait more than five minutes to call 911 - MINUTES MATTER! Fast action can save your life. Calling 911 is almost always the fastest way to get lifesaving treatment. Emergency Medical Services staff can begin treatment when they arrive -- up to an hour sooner than if someone gets to the hospital by car. The discharge information has been reviewed with the patient. The patient verbalized understanding. Discharge medications reviewed with the patient and appropriate educational materials and side effects teaching were provided.   ___________________________________________________________________________________________________________________________________

## 2019-05-30 PROBLEM — E66.01 OBESITY, MORBID (HCC): Status: ACTIVE | Noted: 2019-05-30

## 2019-06-12 ENCOUNTER — HOSPITAL ENCOUNTER (EMERGENCY)
Age: 59
Discharge: HOME | End: 2019-06-13
Attending: EMERGENCY MEDICINE
Payer: COMMERCIAL

## 2019-06-12 ENCOUNTER — APPOINTMENT (OUTPATIENT)
Dept: GENERAL RADIOLOGY | Age: 59
End: 2019-06-12
Attending: EMERGENCY MEDICINE
Payer: COMMERCIAL

## 2019-06-12 DIAGNOSIS — T78.40XA ALLERGIC REACTION, INITIAL ENCOUNTER: Primary | ICD-10-CM

## 2019-06-12 LAB
ALBUMIN SERPL-MCNC: 3.4 G/DL (ref 3.5–5)
ALBUMIN/GLOB SERPL: 0.9 {RATIO} (ref 1.2–3.5)
ALP SERPL-CCNC: 65 U/L (ref 50–136)
ALT SERPL-CCNC: 22 U/L (ref 12–65)
ANION GAP SERPL CALC-SCNC: 8 MMOL/L (ref 7–16)
AST SERPL-CCNC: 28 U/L (ref 15–37)
BILIRUB SERPL-MCNC: 0.5 MG/DL (ref 0.2–1.1)
BUN SERPL-MCNC: 20 MG/DL (ref 6–23)
CALCIUM SERPL-MCNC: 9.6 MG/DL (ref 8.3–10.4)
CHLORIDE SERPL-SCNC: 108 MMOL/L (ref 98–107)
CO2 SERPL-SCNC: 26 MMOL/L (ref 21–32)
CREAT SERPL-MCNC: 1.13 MG/DL (ref 0.6–1)
ERYTHROCYTE [DISTWIDTH] IN BLOOD BY AUTOMATED COUNT: 13.5 % (ref 11.9–14.6)
GLOBULIN SER CALC-MCNC: 3.7 G/DL (ref 2.3–3.5)
GLUCOSE SERPL-MCNC: 83 MG/DL (ref 65–100)
HCT VFR BLD AUTO: 47.9 % (ref 35.8–46.3)
HGB BLD-MCNC: 16.3 G/DL (ref 11.7–15.4)
LACTATE BLD-SCNC: 1.85 MMOL/L (ref 0.5–1.9)
MCH RBC QN AUTO: 30.8 PG (ref 26.1–32.9)
MCHC RBC AUTO-ENTMCNC: 34 G/DL (ref 31.4–35)
MCV RBC AUTO: 90.4 FL (ref 79.6–97.8)
NRBC # BLD: 0 K/UL (ref 0–0.2)
PLATELET # BLD AUTO: 192 K/UL (ref 150–450)
PMV BLD AUTO: 10.5 FL (ref 9.4–12.3)
POTASSIUM SERPL-SCNC: 4.4 MMOL/L (ref 3.5–5.1)
PROT SERPL-MCNC: 7.1 G/DL (ref 6.3–8.2)
RBC # BLD AUTO: 5.3 M/UL (ref 4.05–5.2)
SODIUM SERPL-SCNC: 142 MMOL/L (ref 136–145)
TROPONIN I BLD-MCNC: 0 NG/ML (ref 0.02–0.05)
WBC # BLD AUTO: 13.4 K/UL (ref 4.3–11.1)

## 2019-06-12 PROCEDURE — 93005 ELECTROCARDIOGRAM TRACING: CPT | Performed by: EMERGENCY MEDICINE

## 2019-06-12 PROCEDURE — 96374 THER/PROPH/DIAG INJ IV PUSH: CPT | Performed by: EMERGENCY MEDICINE

## 2019-06-12 PROCEDURE — 74011250636 HC RX REV CODE- 250/636: Performed by: EMERGENCY MEDICINE

## 2019-06-12 PROCEDURE — 99285 EMERGENCY DEPT VISIT HI MDM: CPT | Performed by: EMERGENCY MEDICINE

## 2019-06-12 PROCEDURE — 71046 X-RAY EXAM CHEST 2 VIEWS: CPT

## 2019-06-12 PROCEDURE — 84484 ASSAY OF TROPONIN QUANT: CPT

## 2019-06-12 PROCEDURE — 84145 PROCALCITONIN (PCT): CPT

## 2019-06-12 PROCEDURE — 96375 TX/PRO/DX INJ NEW DRUG ADDON: CPT | Performed by: EMERGENCY MEDICINE

## 2019-06-12 PROCEDURE — 80053 COMPREHEN METABOLIC PANEL: CPT

## 2019-06-12 PROCEDURE — 83605 ASSAY OF LACTIC ACID: CPT

## 2019-06-12 PROCEDURE — 94762 N-INVAS EAR/PLS OXIMTRY CONT: CPT | Performed by: EMERGENCY MEDICINE

## 2019-06-12 PROCEDURE — 85027 COMPLETE CBC AUTOMATED: CPT

## 2019-06-12 RX ORDER — DIPHENHYDRAMINE HYDROCHLORIDE 50 MG/ML
50 INJECTION, SOLUTION INTRAMUSCULAR; INTRAVENOUS
Status: COMPLETED | OUTPATIENT
Start: 2019-06-12 | End: 2019-06-12

## 2019-06-12 RX ORDER — SODIUM CHLORIDE 9 MG/ML
500 INJECTION, SOLUTION INTRAVENOUS ONCE
Status: COMPLETED | OUTPATIENT
Start: 2019-06-12 | End: 2019-06-12

## 2019-06-12 RX ORDER — FAMOTIDINE 10 MG/ML
20 INJECTION INTRAVENOUS
Status: COMPLETED | OUTPATIENT
Start: 2019-06-12 | End: 2019-06-12

## 2019-06-12 RX ORDER — DEXAMETHASONE SODIUM PHOSPHATE 100 MG/10ML
10 INJECTION INTRAMUSCULAR; INTRAVENOUS
Status: COMPLETED | OUTPATIENT
Start: 2019-06-12 | End: 2019-06-12

## 2019-06-12 RX ADMIN — FAMOTIDINE 20 MG: 10 INJECTION, SOLUTION INTRAVENOUS at 23:07

## 2019-06-12 RX ADMIN — DIPHENHYDRAMINE HYDROCHLORIDE 50 MG: 50 INJECTION, SOLUTION INTRAMUSCULAR; INTRAVENOUS at 23:07

## 2019-06-12 RX ADMIN — SODIUM CHLORIDE 500 ML: 900 INJECTION, SOLUTION INTRAVENOUS at 23:06

## 2019-06-12 RX ADMIN — DEXAMETHASONE SODIUM PHOSPHATE 10 MG: 10 INJECTION INTRAMUSCULAR; INTRAVENOUS at 23:07

## 2019-06-13 VITALS
SYSTOLIC BLOOD PRESSURE: 127 MMHG | RESPIRATION RATE: 18 BRPM | WEIGHT: 218 LBS | HEART RATE: 93 BPM | BODY MASS INDEX: 41.16 KG/M2 | DIASTOLIC BLOOD PRESSURE: 72 MMHG | TEMPERATURE: 98 F | HEIGHT: 61 IN | OXYGEN SATURATION: 96 %

## 2019-06-13 LAB
ATRIAL RATE: 79 BPM
CALCULATED P AXIS, ECG09: 65 DEGREES
CALCULATED R AXIS, ECG10: 69 DEGREES
CALCULATED T AXIS, ECG11: 61 DEGREES
DIAGNOSIS, 93000: NORMAL
P-R INTERVAL, ECG05: 190 MS
PROCALCITONIN SERPL-MCNC: <0.1 NG/ML
Q-T INTERVAL, ECG07: 368 MS
QRS DURATION, ECG06: 86 MS
QTC CALCULATION (BEZET), ECG08: 421 MS
TROPONIN I SERPL-MCNC: <0.02 NG/ML (ref 0.02–0.05)
VENTRICULAR RATE, ECG03: 79 BPM

## 2019-06-13 PROCEDURE — 84484 ASSAY OF TROPONIN QUANT: CPT

## 2019-06-13 PROCEDURE — 93005 ELECTROCARDIOGRAM TRACING: CPT | Performed by: EMERGENCY MEDICINE

## 2019-06-13 RX ORDER — FAMOTIDINE 20 MG/1
20 TABLET, FILM COATED ORAL 2 TIMES DAILY
Qty: 20 TAB | Refills: 0 | Status: SHIPPED | OUTPATIENT
Start: 2019-06-13 | End: 2019-06-23

## 2019-06-13 NOTE — DISCHARGE INSTRUCTIONS
Follow with PCP in 24-48 hours. Return to ED if symptoms worsen or progress in any way. Allergic Reaction: Care Instructions  Your Care Instructions    An allergic reaction is an excessive response from your immune system to a medicine, chemical, food, insect bite, or other substance. A reaction can range from mild to life-threatening. Some people have a mild rash, hives, and itching or stomach cramps. In severe reactions, swelling of your tongue and throat can close up your airway so that you cannot breathe. Follow-up care is a key part of your treatment and safety. Be sure to make and go to all appointments, and call your doctor if you are having problems. It's also a good idea to know your test results and keep a list of the medicines you take. How can you care for yourself at home? · If you know what caused your allergic reaction, be sure to avoid it. Your allergy may become more severe each time you have a reaction. · Take an over-the-counter antihistamine, such as cetirizine (Zyrtec) or loratadine (Claritin), to treat mild symptoms. Read and follow directions on the label. Some antihistamines can make you feel sleepy. Do not give antihistamines to a child unless you have checked with your doctor first. Mild symptoms include sneezing or an itchy or runny nose; an itchy mouth; a few hives or mild itching; and mild nausea or stomach discomfort. · Do not scratch hives or a rash. Put a cold, moist towel on them or take cool baths to relieve itching. Put ice packs on hives, swelling, or insect stings for 10 to 15 minutes at a time. Put a thin cloth between the ice pack and your skin. Do not take hot baths or showers. They will make the itching worse. · Your doctor may prescribe a shot of epinephrine to carry with you in case you have a severe reaction. Learn how to give yourself the shot and keep it with you at all times. Make sure it is not .   · Go to the emergency room every time you have a severe reaction, even if you have used your shot of epinephrine and are feeling better. Symptoms can come back after a shot. · Wear medical alert jewelry that lists your allergies. You can buy this at most [employer]. · If your child has a severe allergy, make sure that his or her teachers, babysitters, coaches, and other caregivers know about the allergy. They should have an epinephrine shot, know how and when to give it, and have a plan to take your child to the hospital.  When should you call for help? Give an epinephrine shot if:    · You think you are having a severe allergic reaction.     · You have symptoms in more than one body area, such as mild nausea and an itchy mouth.    After giving an epinephrine shot call 911, even if you feel better.   Call 911 if:    · You have symptoms of a severe allergic reaction. These may include:  ? Sudden raised, red areas (hives) all over your body. ? Swelling of the throat, mouth, lips, or tongue. ? Trouble breathing. ? Passing out (losing consciousness). Or you may feel very lightheaded or suddenly feel weak, confused, or restless.     · You have been given an epinephrine shot, even if you feel better.    Call your doctor now or seek immediate medical care if:    · You have symptoms of an allergic reaction, such as:  ? A rash or hives (raised, red areas on the skin). ? Itching. ? Swelling. ? Belly pain, nausea, or vomiting.    Watch closely for changes in your health, and be sure to contact your doctor if:    · You do not get better as expected. Where can you learn more? Go to http://alanna-barry.info/. Enter Y455 in the search box to learn more about \"Allergic Reaction: Care Instructions. \"  Current as of: June 27, 2018  Content Version: 11.9  © 1470-6630 [employer]. Care instructions adapted under license by [employer] (which disclaims liability or warranty for this information).  If you have questions about a medical condition or this instruction, always ask your healthcare professional. [place] [address] any warranty or liability for your use of this information.

## 2019-06-13 NOTE — ED NOTES
Patient's skin signs continue to improve, patient to PWD with just a few small areas of irritation. Patient reports an occasional palpitation- on monitor to observe for ectopy. Repeat trop drawn and sent- patient restful in bed awaiting further from physician.

## 2019-06-13 NOTE — ED NOTES
I have reviewed discharge instructions with the patient. The patient verbalized understanding. Patient to follow up with UC, PCP and RTED with any changes/concerns. Patient to stop plavix, start brilinta which she's already filled. Patient ambulatory from ED in NAD with Rx x 1.

## 2019-06-13 NOTE — ED TRIAGE NOTES
C/o possible allergic reaction to plavix. Reports has been taking since 5/26 after placement of 2 stents 5/25. Reports contacting dr Corona Faye this AM, instructed to stop taking. Has attempted benadryl x3 today with relief of itching. Reports swelling to hands, arms, lower lip and chin. whelts to lower extremities. Denies swelling to tongue or throat however reports used inhaler on arrival to ed. Pt with respiratory distress upon attempting iv and lab draw. Heart rate in 40s. During iv attempt pt begins vomiting multiple times. Pale. Dr Maurice Bourne in to triage to assess pt.

## 2019-06-13 NOTE — ED NOTES
Patient with significant improvement in skin color, reduction in rash. Patient reports feeling much better. Patient to XR.

## 2019-06-13 NOTE — ED PROVIDER NOTES
I was asked to see patient in triage for concern for allergic reaction. Patient with itching rash since yesterday and generalized weakness this evening with near syncope. She's had some intermittent chest pain and trouble breathing but not currently. No swelling of the throat. Patient is diaphoretic and appears ill. Patient had stent placement in May. Prior to given antibiotic like to see an EKG and probably a point-of-care troponin. Other allergic reaction medications are ordered. Possible cause allergy to Plavix. Cardiac etiology considered. Care transferred to another physician and the main portion of the ER. Physician in triage,  Nick Becker M.D.    ==========================================    53-year-old female with history of hypertension, CAD, and NSTEMI status post stents ×2 in May 2019 presents with complaint of rash localized to left arm and leg since yesterday. States the rash is itching in nature. Denies chest pain, nausea, vomiting, dizziness, weakness. Patient was totally diaphoretic in triage. Patient denies any new MR mental exposures. Denies any insect bites. The history is provided by the patient. No  was used. Allergic Reaction    This is a new problem. The current episode started 3 to 5 hours ago. The problem has not changed since onset. Pertinent negatives include no nausea, no vomiting and no confusion. There were no other medications available.         Past Medical History:   Diagnosis Date    Anxiety     Depression     HTN (hypertension), benign     Migraine headache        Past Surgical History:   Procedure Laterality Date    HX HYSTERECTOMY  1989    abdominal    HX TONSILLECTOMY  1970         Family History:   Problem Relation Age of Onset    Breast Cancer Mother     Colon Polyps Mother     Depression Mother     Cancer Father         liver    Depression Father        Social History     Socioeconomic History    Marital status:  Spouse name: Not on file    Number of children: Not on file    Years of education: Not on file    Highest education level: Not on file   Occupational History    Not on file   Social Needs    Financial resource strain: Not on file    Food insecurity:     Worry: Not on file     Inability: Not on file    Transportation needs:     Medical: Not on file     Non-medical: Not on file   Tobacco Use    Smoking status: Current Every Day Smoker     Packs/day: 0.50     Years: 40.00     Pack years: 20.00     Types: Cigarettes    Smokeless tobacco: Never Used   Substance and Sexual Activity    Alcohol use: No     Alcohol/week: 0.0 oz    Drug use: No    Sexual activity: Not on file   Lifestyle    Physical activity:     Days per week: Not on file     Minutes per session: Not on file    Stress: Not on file   Relationships    Social connections:     Talks on phone: Not on file     Gets together: Not on file     Attends Druze service: Not on file     Active member of club or organization: Not on file     Attends meetings of clubs or organizations: Not on file     Relationship status: Not on file    Intimate partner violence:     Fear of current or ex partner: Not on file     Emotionally abused: Not on file     Physically abused: Not on file     Forced sexual activity: Not on file   Other Topics Concern    Not on file   Social History Narrative    Not on file         ALLERGIES: Atrovent [ipratropium bromide]; Codeine; Erythromycin; Fluticasone propion-salmeterol; Lipitor [atorvastatin]; Lisinopril; and Other plant, animal, environmental    Review of Systems   Constitutional: Positive for fatigue. Negative for chills and fever. HENT: Negative for congestion, facial swelling and sore throat. Respiratory: Positive for wheezing. Negative for cough. Cardiovascular: Negative for chest pain, palpitations and leg swelling. Gastrointestinal: Negative for abdominal pain, blood in stool, nausea and vomiting. Genitourinary: Negative for dysuria and flank pain. Musculoskeletal: Negative for myalgias and neck pain. Skin: Positive for rash. Negative for pallor. Neurological: Positive for dizziness. Negative for light-headedness, numbness and headaches. Psychiatric/Behavioral: Negative for confusion. Vitals:    06/12/19 2251 06/12/19 2306   BP: 118/81 94/64   Pulse: (!) 47    Resp: 22    Temp: 98.1 °F (36.7 °C)    SpO2: 98%    Weight: 98.9 kg (218 lb)    Height: 5' 1\" (1.549 m)             Physical Exam   Constitutional: She is oriented to person, place, and time. She appears well-developed and well-nourished. Well appearing and in NAD. HENT:   Head: Normocephalic. Mouth/Throat: Oropharynx is clear and moist.   MMM. No tonsillar erythema or exudate. Uvula midline. Patient tolerated secretions. No stridor. No trismus. No facial swelling noted   Eyes: Pupils are equal, round, and reactive to light. EOM are normal.   Neck: Normal range of motion. No JVD present. No tracheal deviation present. Cardiovascular: Normal rate, regular rhythm and normal heart sounds. RRR. Pulses 2+ and equal bilaterally. Pulmonary/Chest: Effort normal and breath sounds normal.   CTAB. No wheezes, rhonchi, or rales. Abdominal: Soft. There is no tenderness. There is no guarding. Soft, NTND. No rebound or guarding. No CVAT. Musculoskeletal: Normal range of motion. She exhibits no edema. No LE edema. No calf TTP. Neurological: She is alert and oriented to person, place, and time. No cranial nerve deficit. Strength 5/5 throughout. Normal sensory. Skin: Skin is warm and dry. Rash noted. Rash is maculopapular. Maculopapular rash localized to left arm and leg. No streaking erythema. No evidence of centralized. No drainage. Nursing note and vitals reviewed.        MDM  Number of Diagnoses or Management Options  Allergic reaction, initial encounter: new and requires workup  Diagnosis management comments: She states that she was instructed by cardiologist to stop taking Plavix. Patient underwent a office today to  prescription for Edmond [place]. Complete resolution of rash. Lungs clear bilaterally. Vital signs stable. Patient given steroids, Pepcid, Benadryl. EKG was normal sinus rhythm. Trop negative ×2. Patient states that she is ready for discharge home. Instructed patient to follow-up PCP/cardiology in 24-48 hours. Patient given strict return precautions. Patient with history of diabetes. Given this do not want discharge home with steroid taper. Amount and/or Complexity of Data Reviewed  Clinical lab tests: ordered and reviewed  Tests in the radiology section of CPT®: ordered and reviewed  Tests in the medicine section of CPT®: ordered and reviewed  Review and summarize past medical records: yes  Independent visualization of images, tracings, or specimens: yes    Risk of Complications, Morbidity, and/or Mortality  Presenting problems: moderate  Diagnostic procedures: moderate  Management options: moderate    Patient Progress  Patient progress: stable    ED Course as of Jun 13 0220   Thu Jun 13, 2019   0021 CXR IMPRESSION: Normal chest x-ray. [DF]      ED Course User Index  [DF] Jose F Dubose MD       EKG  Date/Time: 6/12/2019 11:16 PM  Performed by: Jose F Dubose MD  Authorized by:  Jose F Dubose MD     Rate:     ECG rate:  79    ECG rate assessment: normal    Rhythm:     Rhythm: sinus rhythm    Ectopy:     Ectopy: none    QRS:     QRS axis:  Normal    QRS intervals:  Normal  Conduction:     Conduction: normal    ST segments:     ST segments:  Normal  T waves:     T waves: normal          Results Include:    Recent Results (from the past 24 hour(s))   POC TROPONIN-I    Collection Time: 06/12/19 11:00 PM   Result Value Ref Range    Troponin-I (POC) 0 (L) 0.02 - 0.05 ng/ml   CBC W/O DIFF    Collection Time: 06/12/19 11:01 PM   Result Value Ref Range    WBC 13.4 (H) 4.3 - 11.1 K/uL    RBC 5.30 (H) 4.05 - 5.2 M/uL    HGB 16.3 (H) 11.7 - 15.4 g/dL    HCT 47.9 (H) 35.8 - 46.3 %    MCV 90.4 79.6 - 97.8 FL    MCH 30.8 26.1 - 32.9 PG    MCHC 34.0 31.4 - 35.0 g/dL    RDW 13.5 11.9 - 14.6 %    PLATELET 958 925 - 896 K/uL    MPV 10.5 9.4 - 12.3 FL    ABSOLUTE NRBC 0.00 0.0 - 0.2 K/uL   METABOLIC PANEL, COMPREHENSIVE    Collection Time: 06/12/19 11:01 PM   Result Value Ref Range    Sodium 142 136 - 145 mmol/L    Potassium 4.4 3.5 - 5.1 mmol/L    Chloride 108 (H) 98 - 107 mmol/L    CO2 26 21 - 32 mmol/L    Anion gap 8 7 - 16 mmol/L    Glucose 83 65 - 100 mg/dL    BUN 20 6 - 23 MG/DL    Creatinine 1.13 (H) 0.6 - 1.0 MG/DL    GFR est AA >60 >60 ml/min/1.73m2    GFR est non-AA 53 (L) >60 ml/min/1.73m2    Calcium 9.6 8.3 - 10.4 MG/DL    Bilirubin, total 0.5 0.2 - 1.1 MG/DL    ALT (SGPT) 22 12 - 65 U/L    AST (SGOT) 28 15 - 37 U/L    Alk. phosphatase 65 50 - 136 U/L    Protein, total 7.1 6.3 - 8.2 g/dL    Albumin 3.4 (L) 3.5 - 5.0 g/dL    Globulin 3.7 (H) 2.3 - 3.5 g/dL    A-G Ratio 0.9 (L) 1.2 - 3.5     PROCALCITONIN    Collection Time: 06/12/19 11:26 PM   Result Value Ref Range    Procalcitonin <0.1 ng/mL   POC LACTIC ACID    Collection Time: 06/12/19 11:28 PM   Result Value Ref Range    Lactic Acid (POC) 1.85 0.5 - 1.9 mmol/L   TROPONIN I    Collection Time: 06/13/19  1:19 AM   Result Value Ref Range    Troponin-I, Qt. <0.02 (L) 0.02 - 0.05 NG/ML           Gulshan Washburn MD; 6/12/2019 @11:16 PM Voice dictation software was used during the making of this note. This software is not perfect and grammatical and other typographical errors may be present.   This note has not been proofread for errors.  ===================================================================

## 2019-08-29 ENCOUNTER — HOSPITAL ENCOUNTER (OUTPATIENT)
Dept: CARDIAC REHAB | Age: 59
Discharge: HOME | End: 2019-08-29

## 2019-08-30 ENCOUNTER — TELEPHONE (OUTPATIENT)
Dept: DIABETES SERVICES | Age: 59
End: 2019-08-30

## 2019-08-30 NOTE — CARDIO/PULMONARY
Dear Dr. Whittaker Necessary:    Thank you for referring your patient, Brianna Rich (: 1960), to the Cardiopulmonary Rehabilitation Program at [facility].  Mrs. Michael Salgado is a good candidate for the Cardiac Rehab Program and should see improvements with regular participation. We will be addressing appropriate interventions for modifiable risk factors with your patient during the next 12 weeks. We will contact you with any issues or concerns that may arise, or you can follow your patients progress through [facility] at any time. A final summary will be available on [facility] when the program is completed. Again, thank you for your referral. If we can be of further assistance, please feel free to contact the Cardiopulmonary Rehab staff at [phone].     Sincerely,    LISA RamirezN, RN  Cardiopulmonary Rehabilitation Nurse  [facility]

## 2019-09-05 ENCOUNTER — HOSPITAL ENCOUNTER (OUTPATIENT)
Dept: CARDIAC REHAB | Age: 59
Discharge: HOME | End: 2019-09-05
Payer: COMMERCIAL

## 2019-09-05 VITALS — BODY MASS INDEX: 42.33 KG/M2 | HEIGHT: 61 IN | WEIGHT: 224.2 LBS

## 2019-09-05 PROCEDURE — 93798 PHYS/QHP OP CAR RHAB W/ECG: CPT

## 2019-09-06 ENCOUNTER — HOSPITAL ENCOUNTER (OUTPATIENT)
Dept: CARDIAC REHAB | Age: 59
Discharge: HOME | End: 2019-09-06
Payer: COMMERCIAL

## 2019-09-06 PROBLEM — I10 ESSENTIAL HYPERTENSION: Status: ACTIVE | Noted: 2019-09-06

## 2019-09-06 PROCEDURE — 93798 PHYS/QHP OP CAR RHAB W/ECG: CPT

## 2019-09-09 ENCOUNTER — HOSPITAL ENCOUNTER (OUTPATIENT)
Dept: CARDIAC REHAB | Age: 59
End: 2019-09-09
Payer: COMMERCIAL

## 2019-09-11 ENCOUNTER — HOSPITAL ENCOUNTER (OUTPATIENT)
Dept: CARDIAC REHAB | Age: 59
Discharge: HOME | End: 2019-09-11
Payer: COMMERCIAL

## 2019-09-11 VITALS — WEIGHT: 223 LBS | BODY MASS INDEX: 42.14 KG/M2

## 2019-09-11 PROCEDURE — 93798 PHYS/QHP OP CAR RHAB W/ECG: CPT

## 2019-09-13 ENCOUNTER — HOSPITAL ENCOUNTER (OUTPATIENT)
Dept: CARDIAC REHAB | Age: 59
Discharge: HOME | End: 2019-09-13
Payer: COMMERCIAL

## 2019-09-13 PROCEDURE — 93798 PHYS/QHP OP CAR RHAB W/ECG: CPT

## 2019-09-16 ENCOUNTER — HOSPITAL ENCOUNTER (OUTPATIENT)
Dept: CARDIAC REHAB | Age: 59
Discharge: HOME | End: 2019-09-16
Payer: COMMERCIAL

## 2019-09-16 ENCOUNTER — HOSPITAL ENCOUNTER (OUTPATIENT)
Dept: DIABETES SERVICES | Age: 59
Discharge: HOME | End: 2019-09-16
Payer: COMMERCIAL

## 2019-09-16 DIAGNOSIS — E11.9 TYPE 2 DIABETES MELLITUS WITHOUT COMPLICATION, WITHOUT LONG-TERM CURRENT USE OF INSULIN (HCC): Chronic | ICD-10-CM

## 2019-09-16 PROCEDURE — G0108 DIAB MANAGE TRN  PER INDIV: HCPCS

## 2019-09-16 PROCEDURE — 93798 PHYS/QHP OP CAR RHAB W/ECG: CPT

## 2019-09-16 NOTE — PROGRESS NOTES
Came for diabetes educational assessment today. Provided basic information on carbohydrates, proteins and fats. Educational need/plan: Will attend 2 nutrition/2 diabetes sessions to address the following: diabetes disease process, nutritional management, physical activity, using medications, preventing complications, psychosocial adjustment, goal setting, problem solving, monitoring, behavior change strategies. She will on three days attend cardiac rehab after diabetes education.

## 2019-09-18 ENCOUNTER — HOSPITAL ENCOUNTER (OUTPATIENT)
Dept: CARDIAC REHAB | Age: 59
Discharge: HOME | End: 2019-09-18
Payer: COMMERCIAL

## 2019-09-18 VITALS — BODY MASS INDEX: 41.95 KG/M2 | WEIGHT: 222 LBS

## 2019-09-18 PROCEDURE — 93798 PHYS/QHP OP CAR RHAB W/ECG: CPT

## 2019-09-18 NOTE — PROGRESS NOTES
Steph Travis is a 66year old female s/p MI, PCI  On 19 enrolled in cardiac rehabilitation, seen today for initial nutrition counseling. States excellent energy level and no concerns with appetite today. Stated Nutrition Goals: to eat properly and to remain active to and to lose weight. Medical History: Anxiety, DM, HTN, sleep apnea, TIA, obesity-has lost 60 pounds of weight by changes in diet and increasing exercise. Nutrition related medications/supplements: Glucophage (prn), chantix, brilinta, ocuvite,aldactone, caltrate    Nutrition Related Labs:   (H), LDL 70, A1C= 5.4 Blood Sugar Monitoring: pt is checking fasting BS daily, this week ranges from 108-97. Social History/Sleep/Stress: Lives with spouse Skye Olea. Presently unemployed but would like to start working again. She tells me that she has poor sleep quality, and finds challenges managing stress. Is a smoker, down to 4-6 cigarettes per day. Physical Activity: Rehabilitation 3x per week. Food and Nutrition Intake History:   Since Dec 2017 she has made significant changes to the way she eats after her dx of DM, cutting out processed refined carbohydrates and saturated fats. Tells me she is responsible for food purchasing and meal preparation. She feels most comfortable eating a Mediterranean style diet. Dislikes eating breakfast. Does not wish to track calories for weight loss. Prefers sweets and chocolates. Stated 1 day diet recall includes   Breakfast: yogurt, Kaela Pocket with egg white/ turkey sausage + water  Lunch: could not recall;   Dinner: Grilled Chicken + green beans;   PM Snack:chocolate, healthy choice fudge bar. Beverages: water  Alcohol use: does not drink alcohol. GI Hx: /Digestive Issues: NKFA, no concerns. Anthropometric Data:  BMI:  41.95 kg/m² ; BMI class obese for age < 65  Height: 5' 1\" (1.549 m). Weight: 100.7 kg (222 lb).   Waist measurement (inches): 46 inches    Estimated Nutritional Needs:  Calories: MSJ x 1.2  (sedentary) maintenance: 1850kcal/day  MSJ x  (-250kcal) for weight loss: 1600kcal/day   Protein: 80 grams (20% of estimated energy low end needs)  Complex CHO: 208g- (45% of estimated low end energy needs)    Stage of Behavior Change: action    Nutrition Diagnosis:    Imbalanced intake of nutrients related to poor food choices evidenced by nutrition recall, BMI . 25 and WC >35 for women. Nutrition Intervention:  1. Nutrition Education: Educated patient on cardioprotective meal pattern/Mediterranean meal pattern including   Guidelines for saturated fat (<7% total calories), sodium ( < 2000mg/day or follow MD recommendations), and added sugars ( < 25 grams for women/<35 grams for men) and high sources of each reviewed   Consumption of daily fiber intake with emphasis on 7-13 grams of soluble fiber daily and food sources reviewed.  Emphasis and sources of unsaturated fats and specific benefits of omega 3 fatty acids reviewed for cardioprotective benefits.  Demonstrated portions sizes to support weight loss goals   Demonstrated food label reading and food jay ([employer]) for home use to support self efficacy goals.  Sodium education  \"salty six\" foods, food label reading, low sodium food sources and meal planning.  Meal planning- supported barriers to preparing meals at home. Reviewed tips to reduce convenience eating. 2. Weight loss strategies reviewed. Handouts provided for home use:    Lab/body comp with values    Heart healthy eating pattern with 1 day sample menu.  Heart healthy recipes, recipe modification tips.  Code42 plate method   Tips for reducing sodium   Weight loss tips   Meal planning guide    Nutrition Goals:    To improve diet quality by a balanced breakfast daily (CHO/protein) and eating 5 servings of f/v by end of cardiac rehabilitation.      Monitoring/Evaluation: RD to follow up with participant during rehab sessions for questions and assessment of progression toward goals. Compliance: Pt agreeable to incorporated a small breakfast into day to prevent excess snacking.    Barriers: None identified at this time     Cynthia Hays, MS, RD, LD  Cardiac/Pulmonary Rehab Dietitian

## 2019-09-20 ENCOUNTER — HOSPITAL ENCOUNTER (OUTPATIENT)
Dept: CARDIAC REHAB | Age: 59
Discharge: HOME | End: 2019-09-20
Payer: COMMERCIAL

## 2019-09-20 PROCEDURE — 93798 PHYS/QHP OP CAR RHAB W/ECG: CPT

## 2019-09-23 ENCOUNTER — APPOINTMENT (OUTPATIENT)
Dept: CARDIAC REHAB | Age: 59
End: 2019-09-23
Payer: COMMERCIAL

## 2019-09-23 ENCOUNTER — HOSPITAL ENCOUNTER (OUTPATIENT)
Dept: DIABETES SERVICES | Age: 59
Discharge: HOME | End: 2019-09-23
Payer: COMMERCIAL

## 2019-09-23 PROCEDURE — G0109 DIAB MANAGE TRN IND/GROUP: HCPCS

## 2019-09-25 ENCOUNTER — HOSPITAL ENCOUNTER (OUTPATIENT)
Dept: CARDIAC REHAB | Age: 59
Discharge: HOME | End: 2019-09-25
Payer: COMMERCIAL

## 2019-09-25 VITALS — BODY MASS INDEX: 41.98 KG/M2 | WEIGHT: 222.2 LBS

## 2019-09-25 PROCEDURE — 93798 PHYS/QHP OP CAR RHAB W/ECG: CPT

## 2019-09-27 ENCOUNTER — HOSPITAL ENCOUNTER (OUTPATIENT)
Dept: CARDIAC REHAB | Age: 59
Discharge: HOME | End: 2019-09-27
Payer: COMMERCIAL

## 2019-09-27 PROCEDURE — 93798 PHYS/QHP OP CAR RHAB W/ECG: CPT

## 2019-09-30 ENCOUNTER — HOSPITAL ENCOUNTER (OUTPATIENT)
Dept: CARDIAC REHAB | Age: 59
Discharge: HOME | End: 2019-09-30
Payer: COMMERCIAL

## 2019-09-30 PROCEDURE — 93798 PHYS/QHP OP CAR RHAB W/ECG: CPT

## 2019-10-02 ENCOUNTER — HOSPITAL ENCOUNTER (OUTPATIENT)
Dept: CARDIAC REHAB | Age: 59
Discharge: HOME | End: 2019-10-02
Payer: COMMERCIAL

## 2019-10-02 VITALS — WEIGHT: 222.2 LBS | BODY MASS INDEX: 41.98 KG/M2

## 2019-10-02 PROCEDURE — 93798 PHYS/QHP OP CAR RHAB W/ECG: CPT

## 2019-10-04 ENCOUNTER — HOSPITAL ENCOUNTER (OUTPATIENT)
Dept: CARDIAC REHAB | Age: 59
Discharge: HOME | End: 2019-10-04
Payer: COMMERCIAL

## 2019-10-04 PROCEDURE — 93798 PHYS/QHP OP CAR RHAB W/ECG: CPT

## 2019-10-07 ENCOUNTER — HOSPITAL ENCOUNTER (OUTPATIENT)
Dept: CARDIAC REHAB | Age: 59
Discharge: HOME | End: 2019-10-07
Payer: COMMERCIAL

## 2019-10-07 PROCEDURE — 93798 PHYS/QHP OP CAR RHAB W/ECG: CPT

## 2019-10-09 ENCOUNTER — HOSPITAL ENCOUNTER (OUTPATIENT)
Dept: CARDIAC REHAB | Age: 59
Discharge: HOME | End: 2019-10-09
Payer: COMMERCIAL

## 2019-10-09 VITALS — BODY MASS INDEX: 42.17 KG/M2 | WEIGHT: 223.2 LBS

## 2019-10-09 PROCEDURE — 93798 PHYS/QHP OP CAR RHAB W/ECG: CPT

## 2019-10-10 ENCOUNTER — HOSPITAL ENCOUNTER (OUTPATIENT)
Dept: DIABETES SERVICES | Age: 59
Discharge: HOME | End: 2019-10-10
Payer: COMMERCIAL

## 2019-10-10 PROCEDURE — G0109 DIAB MANAGE TRN IND/GROUP: HCPCS

## 2019-10-11 ENCOUNTER — HOSPITAL ENCOUNTER (OUTPATIENT)
Dept: CARDIAC REHAB | Age: 59
Discharge: HOME | End: 2019-10-11
Payer: COMMERCIAL

## 2019-10-11 PROCEDURE — 93798 PHYS/QHP OP CAR RHAB W/ECG: CPT

## 2019-10-14 ENCOUNTER — HOSPITAL ENCOUNTER (OUTPATIENT)
Dept: CARDIAC REHAB | Age: 59
Discharge: HOME | End: 2019-10-14
Payer: COMMERCIAL

## 2019-10-14 PROCEDURE — 93798 PHYS/QHP OP CAR RHAB W/ECG: CPT

## 2019-10-16 ENCOUNTER — HOSPITAL ENCOUNTER (OUTPATIENT)
Dept: CARDIAC REHAB | Age: 59
Discharge: HOME | End: 2019-10-16
Payer: COMMERCIAL

## 2019-10-16 VITALS — WEIGHT: 223.4 LBS | BODY MASS INDEX: 42.21 KG/M2

## 2019-10-16 PROCEDURE — 93798 PHYS/QHP OP CAR RHAB W/ECG: CPT

## 2019-10-18 ENCOUNTER — HOSPITAL ENCOUNTER (OUTPATIENT)
Dept: CARDIAC REHAB | Age: 59
Discharge: HOME | End: 2019-10-18
Payer: COMMERCIAL

## 2019-10-18 PROCEDURE — 93798 PHYS/QHP OP CAR RHAB W/ECG: CPT

## 2019-10-21 ENCOUNTER — HOSPITAL ENCOUNTER (OUTPATIENT)
Dept: CARDIAC REHAB | Age: 59
Discharge: HOME | End: 2019-10-21
Payer: COMMERCIAL

## 2019-10-21 ENCOUNTER — APPOINTMENT (OUTPATIENT)
Dept: CARDIAC REHAB | Age: 59
End: 2019-10-21
Payer: COMMERCIAL

## 2019-10-23 ENCOUNTER — HOSPITAL ENCOUNTER (OUTPATIENT)
Dept: CARDIAC REHAB | Age: 59
End: 2019-10-23
Payer: COMMERCIAL

## 2019-10-24 ENCOUNTER — HOSPITAL ENCOUNTER (OUTPATIENT)
Dept: DIABETES SERVICES | Age: 59
Discharge: HOME | End: 2019-10-24
Payer: COMMERCIAL

## 2019-10-24 PROCEDURE — G0109 DIAB MANAGE TRN IND/GROUP: HCPCS

## 2019-10-24 NOTE — PROGRESS NOTES
Attended nutrition diabetes #2 group session today. Topics included: plate method for portion control; fiber and sodium guidelines; sugar substitutes; alcohol; eating out; recipe modification; label reading. Participant's nutrition goal: to prevent diabetes complications she will eat < 30 grams carbs per meal and eat a protein with the carbs and re-evaluate in March 2020. Participant's nutrition support plan: use materials from class. Barriers identified by participant: none. Voiced/demonstrated understanding of material covered. Anticipated adherence is good. Plan for follow up is: will attend diabetes class.

## 2019-10-25 ENCOUNTER — HOSPITAL ENCOUNTER (OUTPATIENT)
Dept: CARDIAC REHAB | Age: 59
Discharge: HOME | End: 2019-10-25
Payer: COMMERCIAL

## 2019-10-25 VITALS — WEIGHT: 222.4 LBS | BODY MASS INDEX: 42.02 KG/M2

## 2019-10-25 PROCEDURE — 93798 PHYS/QHP OP CAR RHAB W/ECG: CPT

## 2019-10-28 ENCOUNTER — HOSPITAL ENCOUNTER (OUTPATIENT)
Dept: CARDIAC REHAB | Age: 59
Discharge: HOME | End: 2019-10-28
Payer: COMMERCIAL

## 2019-10-28 PROCEDURE — 93798 PHYS/QHP OP CAR RHAB W/ECG: CPT

## 2019-10-30 ENCOUNTER — HOSPITAL ENCOUNTER (OUTPATIENT)
Dept: CARDIAC REHAB | Age: 59
Discharge: HOME | End: 2019-10-30
Payer: COMMERCIAL

## 2019-10-30 VITALS — BODY MASS INDEX: 41.87 KG/M2 | WEIGHT: 221.6 LBS

## 2019-10-30 PROCEDURE — 93798 PHYS/QHP OP CAR RHAB W/ECG: CPT

## 2019-11-01 ENCOUNTER — HOSPITAL ENCOUNTER (OUTPATIENT)
Dept: CARDIAC REHAB | Age: 59
Discharge: HOME | End: 2019-11-01
Payer: COMMERCIAL

## 2019-11-01 PROCEDURE — 93798 PHYS/QHP OP CAR RHAB W/ECG: CPT

## 2019-11-04 ENCOUNTER — HOSPITAL ENCOUNTER (OUTPATIENT)
Dept: CARDIAC REHAB | Age: 59
Discharge: HOME | End: 2019-11-04
Payer: COMMERCIAL

## 2019-11-04 PROCEDURE — 93798 PHYS/QHP OP CAR RHAB W/ECG: CPT

## 2019-11-06 ENCOUNTER — APPOINTMENT (OUTPATIENT)
Dept: CARDIAC REHAB | Age: 59
End: 2019-11-06
Payer: COMMERCIAL

## 2019-11-06 ENCOUNTER — HOSPITAL ENCOUNTER (OUTPATIENT)
Dept: CARDIAC REHAB | Age: 59
End: 2019-11-06
Payer: COMMERCIAL

## 2019-11-06 ENCOUNTER — HOSPITAL ENCOUNTER (OUTPATIENT)
Dept: DIABETES SERVICES | Age: 59
Discharge: HOME | End: 2019-11-06
Payer: COMMERCIAL

## 2019-11-06 ENCOUNTER — HOSPITAL ENCOUNTER (OUTPATIENT)
Dept: CARDIAC REHAB | Age: 59
Discharge: HOME | End: 2019-11-06
Payer: COMMERCIAL

## 2019-11-06 VITALS — BODY MASS INDEX: 41.95 KG/M2 | WEIGHT: 222 LBS

## 2019-11-06 PROCEDURE — G0109 DIAB MANAGE TRN IND/GROUP: HCPCS

## 2019-11-06 PROCEDURE — 93798 PHYS/QHP OP CAR RHAB W/ECG: CPT

## 2019-11-06 NOTE — PROGRESS NOTES
Participant attended Diabetes #2 session today. Topics included: Prevention/detection/treatment of chronic complications; sleep apnea; Developing strategies to promote health/change behavior/recommended screenings; Developing strategies to address psychosocial issues; Goal setting. Participants goal/support plan includes  Diabetes Goal:  To continue blood sugar/ diabetes control and weight loss. I will continue to exercise (currently in cardiac rehabilitation) at least thirty minutes daily at home or gym ,11-6-2019, and reevaluate March 2020 and increase activity as tolerated. Diabetes Plan: Exercise same time- set alarm. Problems/barriers may be:none anticipated   Plan for follow up/Recommendations: mail follow up survey at 6 months and one year.

## 2019-11-08 ENCOUNTER — HOSPITAL ENCOUNTER (OUTPATIENT)
Dept: ULTRASOUND IMAGING | Age: 59
Discharge: HOME | End: 2019-11-08
Attending: INTERNAL MEDICINE

## 2019-11-08 ENCOUNTER — HOSPITAL ENCOUNTER (OUTPATIENT)
Dept: CARDIAC REHAB | Age: 59
Discharge: HOME | End: 2019-11-08
Payer: COMMERCIAL

## 2019-11-08 DIAGNOSIS — M79.605 PAIN OF LEFT LOWER EXTREMITY: ICD-10-CM

## 2019-11-08 PROCEDURE — 93798 PHYS/QHP OP CAR RHAB W/ECG: CPT

## 2019-11-13 ENCOUNTER — APPOINTMENT (OUTPATIENT)
Dept: CARDIAC REHAB | Age: 59
End: 2019-11-13
Payer: COMMERCIAL

## 2019-11-15 ENCOUNTER — HOSPITAL ENCOUNTER (OUTPATIENT)
Dept: CARDIAC REHAB | Age: 59
Discharge: HOME | End: 2019-11-15
Payer: COMMERCIAL

## 2019-11-15 PROCEDURE — 93798 PHYS/QHP OP CAR RHAB W/ECG: CPT

## 2019-11-18 ENCOUNTER — HOSPITAL ENCOUNTER (OUTPATIENT)
Dept: CARDIAC REHAB | Age: 59
End: 2019-11-18
Payer: COMMERCIAL

## 2019-11-20 ENCOUNTER — HOSPITAL ENCOUNTER (OUTPATIENT)
Dept: CARDIAC REHAB | Age: 59
Discharge: HOME | End: 2019-11-20
Payer: COMMERCIAL

## 2019-11-20 VITALS — WEIGHT: 221 LBS | BODY MASS INDEX: 41.76 KG/M2

## 2019-11-20 PROCEDURE — 93798 PHYS/QHP OP CAR RHAB W/ECG: CPT

## 2019-11-22 ENCOUNTER — HOSPITAL ENCOUNTER (OUTPATIENT)
Dept: CARDIAC REHAB | Age: 59
Discharge: HOME | End: 2019-11-22
Payer: COMMERCIAL

## 2019-11-22 PROCEDURE — 93798 PHYS/QHP OP CAR RHAB W/ECG: CPT

## 2019-11-25 ENCOUNTER — HOSPITAL ENCOUNTER (OUTPATIENT)
Dept: CARDIAC REHAB | Age: 59
Discharge: HOME | End: 2019-11-25
Payer: COMMERCIAL

## 2019-11-25 PROCEDURE — 93798 PHYS/QHP OP CAR RHAB W/ECG: CPT

## 2019-11-27 ENCOUNTER — HOSPITAL ENCOUNTER (OUTPATIENT)
Dept: CARDIAC REHAB | Age: 59
Discharge: HOME | End: 2019-11-27
Payer: COMMERCIAL

## 2019-11-27 VITALS — WEIGHT: 222.6 LBS | BODY MASS INDEX: 42.06 KG/M2

## 2019-11-27 PROCEDURE — 93798 PHYS/QHP OP CAR RHAB W/ECG: CPT

## 2019-12-02 ENCOUNTER — HOSPITAL ENCOUNTER (OUTPATIENT)
Dept: CARDIAC REHAB | Age: 59
Discharge: HOME | End: 2019-12-02
Payer: COMMERCIAL

## 2019-12-02 PROCEDURE — 93798 PHYS/QHP OP CAR RHAB W/ECG: CPT

## 2019-12-04 ENCOUNTER — HOSPITAL ENCOUNTER (OUTPATIENT)
Dept: CARDIAC REHAB | Age: 59
End: 2019-12-04
Payer: COMMERCIAL

## 2019-12-05 ENCOUNTER — HOSPITAL ENCOUNTER (OUTPATIENT)
Dept: CARDIAC REHAB | Age: 59
Discharge: HOME | End: 2019-12-05
Payer: COMMERCIAL

## 2019-12-05 VITALS — WEIGHT: 226 LBS | BODY MASS INDEX: 42.7 KG/M2

## 2019-12-05 PROCEDURE — 93798 PHYS/QHP OP CAR RHAB W/ECG: CPT

## 2019-12-06 ENCOUNTER — HOSPITAL ENCOUNTER (OUTPATIENT)
Dept: CARDIAC REHAB | Age: 59
Discharge: HOME | End: 2019-12-06
Payer: COMMERCIAL

## 2019-12-06 PROCEDURE — 93798 PHYS/QHP OP CAR RHAB W/ECG: CPT

## 2019-12-09 ENCOUNTER — HOSPITAL ENCOUNTER (OUTPATIENT)
Dept: CARDIAC REHAB | Age: 59
Discharge: HOME | End: 2019-12-09
Payer: COMMERCIAL

## 2019-12-09 PROCEDURE — 93798 PHYS/QHP OP CAR RHAB W/ECG: CPT

## 2019-12-11 ENCOUNTER — HOSPITAL ENCOUNTER (OUTPATIENT)
Dept: CARDIAC REHAB | Age: 59
Discharge: HOME | End: 2019-12-11
Payer: COMMERCIAL

## 2019-12-11 VITALS — WEIGHT: 221.2 LBS | BODY MASS INDEX: 41.8 KG/M2

## 2019-12-11 PROCEDURE — 93798 PHYS/QHP OP CAR RHAB W/ECG: CPT

## 2019-12-13 ENCOUNTER — APPOINTMENT (OUTPATIENT)
Dept: CARDIAC REHAB | Age: 59
End: 2019-12-13
Payer: COMMERCIAL

## 2019-12-16 ENCOUNTER — HOSPITAL ENCOUNTER (OUTPATIENT)
Dept: CARDIAC REHAB | Age: 59
Discharge: HOME | End: 2019-12-16
Payer: COMMERCIAL

## 2019-12-16 PROCEDURE — 93798 PHYS/QHP OP CAR RHAB W/ECG: CPT

## 2019-12-19 ENCOUNTER — HOSPITAL ENCOUNTER (OUTPATIENT)
Dept: DIABETES SERVICES | Age: 59
Discharge: HOME | End: 2019-12-19
Payer: COMMERCIAL

## 2019-12-19 PROCEDURE — G0109 DIAB MANAGE TRN IND/GROUP: HCPCS

## 2019-12-19 NOTE — PROGRESS NOTES
Attended diabetes follow up group class. Open forum for clients to ask questions based on entire diabetes self management education program. Education topics are driven in this class based on clients' individual questions and needs. Topics included: foot care, signs/symptoms of high and low blood sugar and treatment, glucometer testing, prevention of flu and pneumonia, stress and depression,continuous glucose monitoring, meal planning, weight loss, eating out, portion control, carbohydrates and eating during the holidays.   No medication changes or procedures or surgeries since last visit.

## 2020-05-26 ENCOUNTER — HOSPITAL ENCOUNTER (OUTPATIENT)
Dept: GENERAL RADIOLOGY | Age: 60
Discharge: HOME | End: 2020-05-26
Payer: COMMERCIAL

## 2020-05-26 DIAGNOSIS — R06.02 SOB (SHORTNESS OF BREATH): ICD-10-CM

## 2020-05-26 PROCEDURE — 71046 X-RAY EXAM CHEST 2 VIEWS: CPT

## 2020-11-04 ENCOUNTER — HOSPITAL ENCOUNTER (OUTPATIENT)
Dept: PHYSICAL THERAPY | Age: 60
End: 2020-11-04

## 2021-07-01 ENCOUNTER — APPOINTMENT (RX ONLY)
Dept: URBAN - METROPOLITAN AREA CLINIC 23 | Facility: CLINIC | Age: 61
Setting detail: DERMATOLOGY
End: 2021-07-01

## 2021-07-01 DIAGNOSIS — R20.2 PARESTHESIA OF SKIN: ICD-10-CM

## 2021-07-01 DIAGNOSIS — L57.8 OTHER SKIN CHANGES DUE TO CHRONIC EXPOSURE TO NONIONIZING RADIATION: ICD-10-CM

## 2021-07-01 DIAGNOSIS — Z71.89 OTHER SPECIFIED COUNSELING: ICD-10-CM

## 2021-07-01 DIAGNOSIS — L82.1 OTHER SEBORRHEIC KERATOSIS: ICD-10-CM

## 2021-07-01 DIAGNOSIS — L30.9 DERMATITIS, UNSPECIFIED: ICD-10-CM

## 2021-07-01 PROCEDURE — ? TREATMENT REGIMEN

## 2021-07-01 PROCEDURE — ? COUNSELING

## 2021-07-01 PROCEDURE — ? KOH PREP

## 2021-07-01 PROCEDURE — ? OTHER

## 2021-07-01 PROCEDURE — 99203 OFFICE O/P NEW LOW 30 MIN: CPT

## 2021-07-01 ASSESSMENT — LOCATION ZONE DERM
LOCATION ZONE: TRUNK
LOCATION ZONE: NECK
LOCATION ZONE: ARM

## 2021-07-01 ASSESSMENT — LOCATION SIMPLE DESCRIPTION DERM
LOCATION SIMPLE: RIGHT FOREARM
LOCATION SIMPLE: LEFT UPPER BACK
LOCATION SIMPLE: ABDOMEN
LOCATION SIMPLE: POSTERIOR NECK
LOCATION SIMPLE: RIGHT SHOULDER

## 2021-07-01 ASSESSMENT — LOCATION DETAILED DESCRIPTION DERM
LOCATION DETAILED: LEFT MEDIAL TRAPEZIAL NECK
LOCATION DETAILED: RIGHT POSTERIOR SHOULDER
LOCATION DETAILED: RIGHT VENTRAL PROXIMAL FOREARM
LOCATION DETAILED: RIGHT ANTERIOR SHOULDER
LOCATION DETAILED: LEFT RIB CAGE
LOCATION DETAILED: LEFT INFERIOR UPPER BACK
LOCATION DETAILED: LEFT SUPERIOR MEDIAL UPPER BACK

## 2021-07-01 NOTE — PROCEDURE: KOH PREP
Showing: no pathologic findings
Cpt Desired: 
Koh Procedure Text (Tissue Harvesting Technique): A 15-blade scalpel was used to scrape the skin. The skin scrapings were placed on a glass slide, covered with a coverslip and a KOH solution was applied.
Koh Intro Text (From The.....): A KOH prep was ordered and evaluated from the
Detail Level: Detailed

## 2021-07-01 NOTE — PROCEDURE: OTHER
Detail Level: Simple
Note Text (......Xxx Chief Complaint.): This diagnosis correlates with the
Other (Free Text): She was already treated with 2 weeks of doxy.\\n\\nShe has applied numerous topicals to this area.\\n\\nShe will call if this doesn’t improve
Render Risk Assessment In Note?: no

## 2021-07-01 NOTE — PROCEDURE: COUNSELING
Detail Level: Simple
Detail Level: Generalized
Sunscreen Recommendations: 50 SPF+, sun protective clothing
Detail Level: Detailed

## 2021-07-20 PROBLEM — Z80.8 FAMILY HISTORY OF THYROID CANCER: Status: ACTIVE | Noted: 2021-07-20

## 2021-07-20 PROBLEM — I25.10 CHRONIC CORONARY ARTERY DISEASE: Status: ACTIVE | Noted: 2021-07-20

## 2021-07-20 PROBLEM — E11.9 TYPE 2 DIABETES MELLITUS WITHOUT COMPLICATION, WITHOUT LONG-TERM CURRENT USE OF INSULIN (HCC): Status: ACTIVE | Noted: 2019-05-25

## 2022-02-07 ENCOUNTER — HOSPITAL ENCOUNTER (OUTPATIENT)
Dept: CT IMAGING | Age: 62
Discharge: HOME | End: 2022-02-07
Attending: NURSE PRACTITIONER

## 2022-02-07 VITALS — WEIGHT: 250 LBS | HEIGHT: 61 IN | BODY MASS INDEX: 47.2 KG/M2

## 2022-02-07 DIAGNOSIS — Z12.2 SCREENING FOR MALIGNANT NEOPLASM OF RESPIRATORY ORGAN: ICD-10-CM

## 2022-02-07 DIAGNOSIS — Z87.891 PERSONAL HISTORY OF TOBACCO USE, PRESENTING HAZARDS TO HEALTH: ICD-10-CM

## 2022-02-21 ENCOUNTER — HOSPITAL ENCOUNTER (OUTPATIENT)
Dept: CT IMAGING | Age: 62
Discharge: HOME | End: 2022-02-21
Attending: NURSE PRACTITIONER
Payer: COMMERCIAL

## 2022-02-21 PROCEDURE — 71271 CT THORAX LUNG CANCER SCR C-: CPT

## 2022-02-21 NOTE — PROGRESS NOTES
CT scan is clear. No worrisome findings for lung cancer. Follow up scan in 1 year. Message sent to patient via [address]

## 2022-03-18 PROBLEM — G47.33 OSA (OBSTRUCTIVE SLEEP APNEA): Status: ACTIVE | Noted: 2018-07-11

## 2022-03-18 PROBLEM — J44.9 COPD (CHRONIC OBSTRUCTIVE PULMONARY DISEASE) (HCC): Status: ACTIVE | Noted: 2018-01-30

## 2022-03-18 PROBLEM — E66.01 OBESITY, MORBID (HCC): Status: ACTIVE | Noted: 2019-05-30

## 2022-03-18 PROBLEM — E11.9 TYPE 2 DIABETES MELLITUS WITHOUT COMPLICATION, WITHOUT LONG-TERM CURRENT USE OF INSULIN (HCC): Status: ACTIVE | Noted: 2019-05-25

## 2022-03-18 PROBLEM — R09.02 HYPOXIA: Status: ACTIVE | Noted: 2018-07-11

## 2022-03-19 PROBLEM — I21.4 NSTEMI (NON-ST ELEVATED MYOCARDIAL INFARCTION) (HCC): Status: ACTIVE | Noted: 2019-05-25

## 2022-03-19 PROBLEM — I25.10 CHRONIC CORONARY ARTERY DISEASE: Status: ACTIVE | Noted: 2021-07-20

## 2022-03-19 PROBLEM — I10 ESSENTIAL HYPERTENSION: Status: ACTIVE | Noted: 2019-09-06

## 2022-03-19 PROBLEM — Z72.0 TOBACCO ABUSE: Status: ACTIVE | Noted: 2018-09-05

## 2022-03-19 PROBLEM — F43.9 STRESS AT HOME: Status: ACTIVE | Noted: 2019-05-25

## 2022-03-19 PROBLEM — K21.9 GERD (GASTROESOPHAGEAL REFLUX DISEASE): Status: ACTIVE | Noted: 2019-05-25

## 2022-03-19 PROBLEM — Z80.8 FAMILY HISTORY OF THYROID CANCER: Status: ACTIVE | Noted: 2021-07-20

## 2022-05-09 NOTE — PROGRESS NOTES
Sony Schaefer MD, [address] [facility]            Reason for visit: ***      ASSESSMENT AND PLAN:    ***        History of Present Illness:    Sampson Meigs is here for follow-up of type 2 diabetes mellitus. Current regimen: Metformin 500 mg twice daily, Ozempic 0.25 mg weekly      Current symptoms: See review of systems below      Date of diagnosis: December 2017      Diet: No particular diet      Exercise: Minimal      Diabetes education: The patient has not received formal diabetes education. Diabetic complications:                Retinopathy: Last eye exam was 12/2020 and demonstrated no diabetic retinopathy. Eye care specialist is Dr. Ansley Chapa. Albuminuria/nephropathy:                          8/21/2020: Urine microalbumin to creatinine ratio 753 (+).                          2/24/2021: Urine microalbumin 512.8 (+).                          5/27/2021: Serum creatinine 1.01.                          11/17/2021: Urine microalbumin to creatinine ratio 10 (-), serum creatinine 0.99. Neuropathy: No suggestive symptoms      Home blood glucose monitoring frequency: 1-2 times per day      Blood glucose: by recall              fasting mostly 90s-100s,               AC lunch not checked,               AC supper not checked,               bedtime 100s-120s              30-day average ? (range ?-?)      Hypoglycemia: never      Hemoglobin A1c:   12/18/2017: 11.4%.   5/22/2018: 5.5%. 8/23/2018: 5.4%. 2/27/2019: 5.2%. 8/28/2019: 5.4%. 12/11/2019: 5.1%.   8/21/2020: 5.6%. 2/24/2021: 6.7%.   5/27/2021: 5.9%.   7/20/2021: 5.4%. 11/17/2021: 5.4%. Other pertinent labs:              Lipids:                           2/24/2021: Total cholesterol 211, triglycerides 207, HDL 48, .                          11/17/2021: Total cholesterol 117, triglycerides 170, HDL 52, LDL 37.                  Thyroid: See below         THYROID DYSFUNCTION   Natalio Flynn is seen for follow-up of hypothyroidism; this was diagnosed in 2020. Current symptoms:  See review of systems below      Prior treatment: Levothyroxine 25 mcg daily was started 3/3/2021. Pertinent labs:   2016: TSH 4.180.   2017: TSH 1.990, T4 7.5.   2019: TSH 3.800, free T4 1.03.   2020: TSH 4.510, free T4 0.90.   2021: TSH 5.960, free T4 0.95.   2021: TSH 4.050, free T4 1.04, T3 122.   2021: TSH 2.300, free T4 1. 19. Imagin2021: Ultrasound ([facility])- Right lobe 1.91 x 1.46 x 3.74 cm, isthmus 0.42 cm, left lobe 1.30 x 1.50 x 4.05 cm. Homogeneous  echotexture. Normal blood flow. 0.37 x 0.61 x 0.58 cm heterogeneous isoechoic nodule without calcifications or increased blood flow in the midportion of the right lobe (TR 3). 0.32 x 0.47 x 0.84 cm complex isoechoic nodule without calcifications or  increased blood flow in the isthmus (TR 3). Review of Systems    There were no vitals taken for this visit. Wt Readings from Last 3 Encounters:   02/15/22 257 lb (116.6 kg)   22 250 lb (113.4 kg)   22 257 lb (116.6 kg)       Physical Exam    No orders of the defined types were placed in this encounter.       Current Outpatient Medications   Medication Sig Dispense Refill    albuterol sulfate  (90 Base) MCG/ACT inhaler Inhale 2 puffs into the lungs every 4 hours as needed      aspirin 81 MG EC tablet Take 81 mg by mouth daily      cetirizine (ZYRTEC) 10 MG tablet Take by mouth      cyanocobalamin 1000 MCG tablet Take 1,000 mcg by mouth daily      Evolocumab (REPATHA SURECLICK) 139 MG/ML SOAJ Inject 140 mg into the skin every 14 days      guaiFENesin 1200 MG TB12 Take 1,200 mg by mouth 2 times daily      levothyroxine (SYNTHROID) 25 MCG tablet Take 25 mcg by mouth every morning (before breakfast)      LORazepam (ATIVAN) 1 MG tablet TAKE 1 TABLET BY MOUTH EVERY 4 HOURS AS NEEDED FOR ANXIETY (MAX DAILY AMOUNT 6 TABLETS)      metFORMIN (GLUCOPHAGE) 500 MG tablet Take 500 mg by mouth 2 times daily (with meals)      nitroGLYCERIN (NITROSTAT) 0.4 MG SL tablet Place 0.4 mg under the tongue      olmesartan (BENICAR) 5 MG tablet Take 5 mg by mouth daily      pravastatin (PRAVACHOL) 40 MG tablet Take 40 mg by mouth      Semaglutide,0.25 or 0.5MG/DOS, (OZEMPIC, 0.25 OR 0.5 MG/DOSE,) 2 MG/1.5ML SOPN Inject 0.25 mg into the skin every 7 days      spironolactone (ALDACTONE) 25 MG tablet Take 25 mg by mouth daily      umeclidinium-vilanterol (ANORO ELLIPTA) 62.5-25 MCG/INH AEPB inhaler Inhale 1 puff into the lungs daily       No current facility-administered medications for this visit. Janeth Hayes MD, FACE      Portions of this note were generated with the assistance of voice recognition software. As such, some errors in transcription may be present.

## 2022-05-11 NOTE — PROGRESS NOTES
Rose Kim MD, [address] [facility] ENDOCRINOLOGY   AND   THYROID NODULE CLINIC            Reason for visit: ***      ASSESSMENT AND PLAN:    ***        History of Present Illness:    DIABETES MELLITUS  Leopold Jump is here for follow-up of type 2 diabetes mellitus.       Current regimen: Metformin 500 mg twice daily, Ozempic 0.25 mg weekly     Current symptoms: See review of systems below     Date of diagnosis: December 2017     Diet: No particular diet     Exercise: Minimal     Diabetes education: The patient has not received formal diabetes education.     Diabetic complications:                Retinopathy: Last eye exam was 12/2020 and demonstrated no diabetic retinopathy. Eye care specialist is Dr. Jeancarlos Pressley.                 Albuminuria/nephropathy:                          8/21/2020: Urine microalbumin to creatinine ratio 753 (+).                          2/24/2021: Urine microalbumin 512.8 (+).                          5/27/2021: Serum creatinine 1.01.                          11/17/2021: Urine microalbumin to creatinine ratio 10 (-), serum creatinine 0.99.                 Neuropathy: No suggestive symptoms     Home blood glucose monitoring frequency: 1-2 times per day     Blood glucose: by recall              fasting mostly 90s-100s,               AC lunch not checked,               AC supper not checked,               bedtime 100s-120s              30-day average ? (range ?-?)     Hypoglycemia: never     Hemoglobin A1c:  12/18/2017: 11.4%.  5/22/2018: 5.5%. 8/23/2018: 5.4%. 2/27/2019: 5.2%. 8/28/2019: 5.4%. 12/11/2019: 5.1%.  8/21/2020: 5.6%. 2/24/2021: 6.7%.  5/27/2021: 5.9%.  7/20/2021: 5.4%. 11/17/2021: 5.4%.     Other pertinent labs:              Lipids:                           2/24/2021: Total cholesterol 211, triglycerides 207, HDL 48, .                          11/17/2021:  Total cholesterol 117, triglycerides 170, HDL 52, LDL 37.                 Thyroid: See below        THYROID DYSFUNCTION  Sampson Meigs is seen for follow-up of hypothyroidism; this was diagnosed in 2020.       Current symptoms:  See review of systems below     Prior treatment: Levothyroxine 25 mcg daily was started 3/3/2021.     Pertinent labs:  2016: TSH 4.180.  2017: TSH 1.990, T4 7.5.  2019: TSH 3.800, free T4 1.03.  2020: TSH 4.510, free T4 0.90.  2021: TSH 5.960, free T4 0.95.  2021: TSH 4.050, free T4 1.04, T3 122.  2021: TSH 2.300, free T4 1. 19.     Imagin2021: Ultrasound ([facility])- Right lobe 1.91 x 1.46 x 3.74 cm, isthmus 0.42 cm, left lobe 1.30 x 1.50 x 4.05 cm. Homogeneous echotexture. Normal blood flow. 0.37 x 0.61 x 0.58 cm heterogeneous isoechoic nodule without calcifications or increased blood flow in the midportion of the right lobe (TR 3). 0.32 x 0.47 x 0.84 cm complex isoechoic nodule without calcifications or increased blood flow in the isthmus (TR 3).         Review of Systems    There were no vitals taken for this visit. Wt Readings from Last 3 Encounters:   02/15/22 257 lb (116.6 kg)   22 250 lb (113.4 kg)   22 257 lb (116.6 kg)       Physical Exam    No orders of the defined types were placed in this encounter.       Current Outpatient Medications   Medication Sig Dispense Refill    albuterol sulfate  (90 Base) MCG/ACT inhaler Inhale 2 puffs into the lungs every 4 hours as needed      aspirin 81 MG EC tablet Take 81 mg by mouth daily      cetirizine (ZYRTEC) 10 MG tablet Take by mouth      cyanocobalamin 1000 MCG tablet Take 1,000 mcg by mouth daily      Evolocumab (REPATHA SURECLICK) 507 MG/ML SOAJ Inject 140 mg into the skin every 14 days      guaiFENesin 1200 MG TB12 Take 1,200 mg by mouth 2 times daily      levothyroxine (SYNTHROID) 25 MCG tablet Take 25 mcg by mouth every morning (before breakfast)      LORazepam (ATIVAN) 1 MG tablet TAKE 1 TABLET BY MOUTH EVERY 4 HOURS AS NEEDED FOR ANXIETY (MAX DAILY AMOUNT 6 TABLETS)      metFORMIN (GLUCOPHAGE) 500 MG tablet Take 500 mg by mouth 2 times daily (with meals)      nitroGLYCERIN (NITROSTAT) 0.4 MG SL tablet Place 0.4 mg under the tongue      olmesartan (BENICAR) 5 MG tablet Take 5 mg by mouth daily      pravastatin (PRAVACHOL) 40 MG tablet Take 40 mg by mouth      Semaglutide,0.25 or 0.5MG/DOS, (OZEMPIC, 0.25 OR 0.5 MG/DOSE,) 2 MG/1.5ML SOPN Inject 0.25 mg into the skin every 7 days      spironolactone (ALDACTONE) 25 MG tablet Take 25 mg by mouth daily      umeclidinium-vilanterol (ANORO ELLIPTA) 62.5-25 MCG/INH AEPB inhaler Inhale 1 puff into the lungs daily       No current facility-administered medications for this visit. Renee Stanley MD, FACE      Portions of this note were generated with the assistance of voice recognition software. As such, some errors in transcription may be present.

## 2022-05-18 ENCOUNTER — HOSPITAL ENCOUNTER (OUTPATIENT)
Dept: LAB | Age: 62
Discharge: HOME | End: 2022-05-18
Payer: COMMERCIAL

## 2022-05-18 DIAGNOSIS — E03.9 PRIMARY HYPOTHYROIDISM: ICD-10-CM

## 2022-05-18 DIAGNOSIS — E11.9 TYPE 2 DIABETES MELLITUS WITHOUT COMPLICATION, WITHOUT LONG-TERM CURRENT USE OF INSULIN (HCC): ICD-10-CM

## 2022-05-18 DIAGNOSIS — R80.9 ALBUMINURIA: ICD-10-CM

## 2022-05-18 DIAGNOSIS — E78.00 HYPERCHOLESTEREMIA: ICD-10-CM

## 2022-05-18 LAB
ALBUMIN SERPL-MCNC: 3.8 G/DL (ref 3.5–5)
ALBUMIN/GLOB SERPL: 1.1 {RATIO} (ref 1.2–3.5)
ALP SERPL-CCNC: 59 U/L (ref 50–136)
ALT SERPL-CCNC: 33 U/L (ref 12–65)
ANION GAP SERPL CALC-SCNC: 6 MMOL/L (ref 7–16)
AST SERPL-CCNC: 16 U/L (ref 15–37)
BILIRUB SERPL-MCNC: 0.3 MG/DL (ref 0.2–1.1)
BUN SERPL-MCNC: 16 MG/DL (ref 6–23)
CALCIUM SERPL-MCNC: 9.4 MG/DL (ref 8.3–10.4)
CHLORIDE SERPL-SCNC: 107 MMOL/L (ref 98–107)
CHOLEST SERPL-MCNC: 119 MG/DL
CO2 SERPL-SCNC: 27 MMOL/L (ref 21–32)
CREAT SERPL-MCNC: 1 MG/DL (ref 0.6–1)
CREAT UR-MCNC: 111 MG/DL
EST. AVERAGE GLUCOSE BLD GHB EST-MCNC: 105 MG/DL
GLOBULIN SER CALC-MCNC: 3.4 G/DL (ref 2.3–3.5)
GLUCOSE SERPL-MCNC: 98 MG/DL (ref 65–100)
HBA1C MFR BLD: 5.3 % (ref 4.2–6.3)
HDLC SERPL-MCNC: 54 MG/DL (ref 40–60)
HDLC SERPL: 2.2 {RATIO}
LDLC SERPL CALC-MCNC: 32 MG/DL
MICROALBUMIN UR-MCNC: 1.04 MG/DL
MICROALBUMIN/CREAT UR-RTO: 9 MG/G
POTASSIUM SERPL-SCNC: 3.8 MMOL/L (ref 3.5–5.1)
PROT SERPL-MCNC: 7.2 G/DL (ref 6.3–8.2)
SODIUM SERPL-SCNC: 140 MMOL/L (ref 136–145)
TRIGL SERPL-MCNC: 165 MG/DL (ref 35–150)
TSH W FREE THYROID I,TSHELE: 2.77 UIU/ML (ref 0.36–3.74)
VLDLC SERPL CALC-MCNC: 33 MG/DL (ref 6–23)

## 2022-05-18 PROCEDURE — 80061 LIPID PANEL: CPT

## 2022-05-18 PROCEDURE — 83036 HEMOGLOBIN GLYCOSYLATED A1C: CPT

## 2022-05-18 PROCEDURE — 84443 ASSAY THYROID STIM HORMONE: CPT

## 2022-05-18 PROCEDURE — 80053 COMPREHEN METABOLIC PANEL: CPT

## 2022-05-18 PROCEDURE — 36415 COLL VENOUS BLD VENIPUNCTURE: CPT

## 2022-05-18 PROCEDURE — 82043 UR ALBUMIN QUANTITATIVE: CPT

## 2022-05-23 ENCOUNTER — OFFICE VISIT (OUTPATIENT)
Dept: ENDOCRINOLOGY | Age: 62
End: 2022-05-23
Payer: COMMERCIAL

## 2022-05-23 VITALS
HEART RATE: 79 BPM | SYSTOLIC BLOOD PRESSURE: 102 MMHG | BODY MASS INDEX: 46.66 KG/M2 | WEIGHT: 251 LBS | DIASTOLIC BLOOD PRESSURE: 68 MMHG | OXYGEN SATURATION: 97 %

## 2022-05-23 DIAGNOSIS — E11.9 TYPE 2 DIABETES MELLITUS WITHOUT COMPLICATION, WITHOUT LONG-TERM CURRENT USE OF INSULIN (HCC): Primary | ICD-10-CM

## 2022-05-23 DIAGNOSIS — E03.9 PRIMARY HYPOTHYROIDISM: ICD-10-CM

## 2022-05-23 DIAGNOSIS — I10 ESSENTIAL HYPERTENSION: ICD-10-CM

## 2022-05-23 DIAGNOSIS — Z80.8 FAMILY HISTORY OF THYROID CANCER: ICD-10-CM

## 2022-05-23 DIAGNOSIS — R80.9 ALBUMINURIA: ICD-10-CM

## 2022-05-23 DIAGNOSIS — E04.2 MULTIPLE THYROID NODULES: ICD-10-CM

## 2022-05-23 DIAGNOSIS — E78.00 HYPERCHOLESTEREMIA: ICD-10-CM

## 2022-05-23 DIAGNOSIS — I25.10 CHRONIC CORONARY ARTERY DISEASE: ICD-10-CM

## 2022-05-23 PROCEDURE — 3044F HG A1C LEVEL LT 7.0%: CPT | Performed by: INTERNAL MEDICINE

## 2022-05-23 PROCEDURE — 99214 OFFICE O/P EST MOD 30 MIN: CPT | Performed by: INTERNAL MEDICINE

## 2022-05-23 RX ORDER — EVOLOCUMAB 140 MG/ML
140 INJECTION, SOLUTION SUBCUTANEOUS
Qty: 6 ML | Refills: 3
Start: 2022-05-23

## 2022-05-23 RX ORDER — SEMAGLUTIDE 1.34 MG/ML
0.5 INJECTION, SOLUTION SUBCUTANEOUS WEEKLY
Qty: 4.5 ML | Refills: 11 | Status: SHIPPED | OUTPATIENT
Start: 2022-05-23 | End: 2022-09-12 | Stop reason: SDUPTHER

## 2022-05-23 RX ORDER — LEVOTHYROXINE SODIUM 0.03 MG/1
25 TABLET ORAL
Qty: 90 TABLET | Refills: 3 | Status: SHIPPED | OUTPATIENT
Start: 2022-05-23

## 2022-05-23 RX ORDER — SEMAGLUTIDE 1.34 MG/ML
0.8 INJECTION, SOLUTION SUBCUTANEOUS WEEKLY
COMMUNITY
End: 2022-05-23 | Stop reason: SDUPTHER

## 2022-05-23 RX ORDER — SPIRONOLACTONE 25 MG/1
25 TABLET ORAL DAILY
Qty: 90 TABLET | Refills: 3
Start: 2022-05-23 | End: 2022-08-19 | Stop reason: SDUPTHER

## 2022-05-23 RX ORDER — OLMESARTAN MEDOXOMIL 5 MG/1
5 TABLET ORAL DAILY
Qty: 90 TABLET | Refills: 3
Start: 2022-05-23 | End: 2022-07-18 | Stop reason: SDUPTHER

## 2022-05-23 RX ORDER — PRAVASTATIN SODIUM 40 MG
40 TABLET ORAL DAILY
Qty: 90 TABLET | Refills: 3
Start: 2022-05-23

## 2022-05-23 ASSESSMENT — ENCOUNTER SYMPTOMS
VOICE CHANGE: 0
TROUBLE SWALLOWING: 0

## 2022-05-23 NOTE — PROGRESS NOTES
Lisa Granados MD, [address] [facility]            Reason for visit: Follow-up of diabetes, hypothyroidism, thyroid nodule      ASSESSMENT AND PLAN:    1. Type 2 diabetes mellitus without complication, without long-term current use of insulin (HCC)  Glycemic control is excellent. No changes. This does not require specialty care. - metFORMIN (GLUCOPHAGE) 500 MG tablet; Take 1 tablet by mouth 2 times daily (with meals)  Dispense: 180 tablet; Refill: 3  - OZEMPIC, 0.25 OR 0.5 MG/DOSE, 2 MG/1.5ML SOPN; Inject 0.5 mg into the skin once a week  Dispense: 4.5 mL; Refill: 11  - Hemoglobin A1c with eAG; Future  - Comprehensive Metabolic Panel; Future    2. Multiple thyroid nodules  Per ACR criteria, ongoing ultrasound follow-up is not necessary. However, because of family history of thyroid cancer, periodic ultrasound is warranted. 3. Primary hypothyroidism  She is clinically and biochemically euthyroid. Continue levothyroxine as currently prescribed. - levothyroxine (SYNTHROID) 25 MCG tablet; Take 1 tablet by mouth every morning (before breakfast)  Dispense: 90 tablet; Refill: 3  - TSH with Reflex to FT4; Future    4. Essential hypertension  BP: 102/68   - spironolactone (ALDACTONE) 25 MG tablet; Take 1 tablet by mouth daily  Dispense: 90 tablet; Refill: 3  - olmesartan (BENICAR) 5 MG tablet; Take 1 tablet by mouth daily  Dispense: 90 tablet; Refill: 3    5. Hypercholesteremia  Lab Results   Component Value Date    LDLCALC 32 05/18/2022     - pravastatin (PRAVACHOL) 40 MG tablet; Take 1 tablet by mouth daily  Dispense: 90 tablet; Refill: 3  - REPATHA SURECLICK 937 MG/ML SOAJ; Inject 140 mg into the skin every 14 days  Dispense: 6 mL; Refill: 3  - Lipid Panel; Future    6. Albuminuria  No results found for: LABMICR, [id number]  - olmesartan (BENICAR) 5 MG tablet; Take 1 tablet by mouth daily  Dispense: 90 tablet;  Refill: 3  - Microalbumin / Creatinine Urine Ratio; Future    7. Chronic coronary artery disease  - OZEMPIC, 0.25 OR 0.5 MG/DOSE, 2 MG/1.5ML SOPN; Inject 0.5 mg into the skin once a week  Dispense: 4.5 mL; Refill: 11    8. Family history of thyroid cancer  She indicates that her son has a history of medullary thyroid cancer metastatic to cervical lymph nodes but says that he was treated with surgery followed by radioactive iodine. Medullary thyroid cancer is not treated with radioactive iodine, so I suspect that he actually had papillary thyroid carcinoma. I asked her to try to find out. Follow-up and Dispositions    · Return in about 6 months (around 11/23/2022). History of Present Illness:    DIABETES MELLITUS  Syed Model is here for follow-up of type 2 diabetes mellitus.       Current regimen: Metformin 500 mg twice daily, Ozempic 0.5 mg weekly     Current symptoms: See review of systems below     Date of diagnosis: December 2017     Diet: No particular diet     Exercise: Minimal     Diabetes education: The patient has not received formal diabetes education.     Diabetic complications:                Retinopathy: Last eye exam was 12/6/2021 and demonstrated no diabetic retinopathy.   Eye care specialist is Dr. David Jeffrey.                 Albuminuria/nephropathy:                          8/21/2020: Urine microalbumin to creatinine ratio 753 (+).                          2/24/2021: Urine microalbumin 512.8 (+).                          5/27/2021: Serum creatinine 1.01.                          11/17/2021: Urine microalbumin to creatinine ratio 10 (-), serum creatinine 0.99.    5/18/2022: Urine microalbumin to creatinine ratio 9 (-), serum creatinine 1.00.                 Neuropathy: No suggestive symptoms     Home blood glucose monitoring frequency: 1-2 times per day     Blood glucose: by recall              fasting mostly 90s-100s,               AC lunch not checked,               AC supper not checked,               bedtime 100s-120s 30-day average ? (range ?-?)     Hypoglycemia: never     Hemoglobin A1c:  2017: 11.4%.  2018: 5.5%. 2018: 5.4%. 2019: 5.2%. 2019: 5.4%. 2019: 5.1%.  2020: 5.6%. 2021: 6.7%.  2021: 5.9%.  2021: 5.4%. 2021: 5.4%. 2022: 5.3%.     Other pertinent labs:              Lipids:                          2022: Total cholesterol 119, triglycerides 165, HDL 54, LDL 32.                 Thyroid:                           See below        THYROID DYSFUNCTION  Allison Lynch is seen for follow-up of hypothyroidism; this was diagnosed in 2020.       Current symptoms:  See review of systems below     Prior treatment: Levothyroxine 25 mcg daily was started 3/3/2021.     Pertinent labs:  2016: TSH 4.180.  2017: TSH 1.990, T4 7.5.  2019: TSH 3.800, free T4 1.03.  2020: TSH 4.510, free T4 0.90.  2021: TSH 5.960, free T4 0.95.  2021: TSH 4.050, free T4 1.04, T3 122.  2021: TSH 2.300, free T4 1. 19.  2022: TSH 2.77.     Imagin2021: Ultrasound ([facility])- Right lobe 1.91 x 1.46 x 3.74 cm, isthmus 0.42 cm, left lobe 1.30 x 1.50 x 4.05 cm. Homogeneous echotexture. Normal blood flow. 0.37 x 0.61 x 0.58 cm heterogeneous isoechoic nodule without calcifications or increased blood flow in the midportion of the right lobe (TR 3). 0.32 x 0.47 x 0.84 cm complex isoechoic nodule without calcifications or increased blood flow in the isthmus (TR 3). Review of Systems   Constitutional: Negative for fatigue and unexpected weight change (intentionally lost 6 pounds in 3 months). HENT: Negative for trouble swallowing and voice change. Eyes: Negative for visual disturbance.        /68 (Site: Left Upper Arm, Position: Sitting)   Pulse 79   Wt 251 lb (113.9 kg)   SpO2 97%   BMI 46.66 kg/m²   Wt Readings from Last 3 Encounters:   22 251 lb (113.9 kg)   02/15/22 257 lb (116.6 kg)   22 250 lb (113.4 kg)       Physical Exam  HENT:      Head: Normocephalic. Neck:      Thyroid: No thyroid mass or thyromegaly. Cardiovascular:      Rate and Rhythm: Normal rate. Pulmonary:      Effort: No respiratory distress. Breath sounds: Normal breath sounds. Neurological:      Mental Status: She is alert.    Psychiatric:         Mood and Affect: Mood normal.         Behavior: Behavior normal.         Orders Placed This Encounter   Procedures    TSH with Reflex to FT4     Standing Status:   Future     Standing Expiration Date:   5/23/2023    Hemoglobin A1c with eAG     Standing Status:   Future     Standing Expiration Date:   5/23/2023    Comprehensive Metabolic Panel     Standing Status:   Future     Standing Expiration Date:   5/23/2023    Lipid Panel     Standing Status:   Future     Standing Expiration Date:   5/23/2023     Order Specific Question:   Is Patient Fasting?/# of Hours     Answer:   12    Microalbumin / Creatinine Urine Ratio     Standing Status:   Future     Standing Expiration Date:   5/23/2023       Current Outpatient Medications   Medication Sig Dispense Refill    levothyroxine (SYNTHROID) 25 MCG tablet Take 1 tablet by mouth every morning (before breakfast) 90 tablet 3    metFORMIN (GLUCOPHAGE) 500 MG tablet Take 1 tablet by mouth 2 times daily (with meals) 180 tablet 3    OZEMPIC, 0.25 OR 0.5 MG/DOSE, 2 MG/1.5ML SOPN Inject 0.5 mg into the skin once a week 4.5 mL 11    pravastatin (PRAVACHOL) 40 MG tablet Take 1 tablet by mouth daily 90 tablet 3    REPATHA SURECLICK 122 MG/ML SOAJ Inject 140 mg into the skin every 14 days 6 mL 3    spironolactone (ALDACTONE) 25 MG tablet Take 1 tablet by mouth daily 90 tablet 3    olmesartan (BENICAR) 5 MG tablet Take 1 tablet by mouth daily 90 tablet 3    albuterol sulfate  (90 Base) MCG/ACT inhaler Inhale 2 puffs into the lungs every 4 hours as needed      aspirin 81 MG EC tablet Take 81 mg by mouth daily      cetirizine (ZYRTEC) 10 MG tablet Take by mouth      guaiFENesin 1200 MG TB12 Take 1,200 mg by mouth 2 times daily      LORazepam (ATIVAN) 1 MG tablet TAKE 1 TABLET BY MOUTH EVERY 4 HOURS AS NEEDED FOR ANXIETY (MAX DAILY AMOUNT 6 TABLETS)      nitroGLYCERIN (NITROSTAT) 0.4 MG SL tablet Place 0.4 mg under the tongue      umeclidinium-vilanterol (ANORO ELLIPTA) 62.5-25 MCG/INH AEPB inhaler Inhale 1 puff into the lungs daily       No current facility-administered medications for this visit. Mera Dacosta MD, FACE      Portions of this note were generated with the assistance of voice recognition software. As such, some errors in transcription may be present.

## 2022-05-31 ENCOUNTER — OFFICE VISIT (OUTPATIENT)
Dept: PRIMARY CARE CLINIC | Facility: CLINIC | Age: 62
End: 2022-05-31
Payer: COMMERCIAL

## 2022-05-31 VITALS
BODY MASS INDEX: 46.38 KG/M2 | SYSTOLIC BLOOD PRESSURE: 111 MMHG | DIASTOLIC BLOOD PRESSURE: 57 MMHG | TEMPERATURE: 97.7 F | RESPIRATION RATE: 18 BRPM | OXYGEN SATURATION: 94 % | HEIGHT: 62 IN | WEIGHT: 252 LBS

## 2022-05-31 DIAGNOSIS — J44.9 CHRONIC OBSTRUCTIVE PULMONARY DISEASE, UNSPECIFIED COPD TYPE (HCC): ICD-10-CM

## 2022-05-31 DIAGNOSIS — N60.11 FIBROCYSTIC BREAST CHANGES OF BOTH BREASTS: Primary | ICD-10-CM

## 2022-05-31 DIAGNOSIS — I10 ESSENTIAL HYPERTENSION: ICD-10-CM

## 2022-05-31 DIAGNOSIS — F41.9 ANXIETY: ICD-10-CM

## 2022-05-31 DIAGNOSIS — N60.12 FIBROCYSTIC BREAST CHANGES OF BOTH BREASTS: Primary | ICD-10-CM

## 2022-05-31 DIAGNOSIS — Z80.3 FAMILY HISTORY OF BREAST CANCER IN MOTHER: ICD-10-CM

## 2022-05-31 PROCEDURE — 99214 OFFICE O/P EST MOD 30 MIN: CPT | Performed by: FAMILY MEDICINE

## 2022-05-31 RX ORDER — LORAZEPAM 1 MG/1
1 TABLET ORAL EVERY 8 HOURS PRN
Qty: 30 TABLET | Refills: 0 | Status: SHIPPED | OUTPATIENT
Start: 2022-05-31 | End: 2022-06-30

## 2022-05-31 ASSESSMENT — PATIENT HEALTH QUESTIONNAIRE - PHQ9
2. FEELING DOWN, DEPRESSED OR HOPELESS: 0
SUM OF ALL RESPONSES TO PHQ9 QUESTIONS 1 & 2: 0
SUM OF ALL RESPONSES TO PHQ QUESTIONS 1-9: 0
SUM OF ALL RESPONSES TO PHQ QUESTIONS 1-9: 0
1. LITTLE INTEREST OR PLEASURE IN DOING THINGS: 0
SUM OF ALL RESPONSES TO PHQ QUESTIONS 1-9: 0
SUM OF ALL RESPONSES TO PHQ QUESTIONS 1-9: 0

## 2022-05-31 NOTE — PROGRESS NOTES
Lili Pardo MD  [address] [facility] [address]  Ph No:  [phone]  Fax:  [phone]:  Chief Complaint   Patient presents with    Follow-up     Pt has no issues today    Abnormal Mammogram     Pt says that she got a letter from radiology that she needs a MRI of the breast.          HISTORY OF PRESENT ILLNESS:  Ms. Amy Garza is a 63 y.o. female . Pt presents for recheck. Pt reports that Dr. Aury Aguilar, endocrinologist did labs and was mostly normal.  Pt says has quit worrying about losing weight and says she goes to gym and feels good about self.  hgba1c is 5.2%. Pt is morbidly obese, BMI 46.84. Pt is using ozempic and despite that is not losing any weight. Pt is advised about weight loss surgery, but declines any referral at this time. PT says is also good on refills. All refills reviewed with pt and only ativan 1mg po daily is needed, pt says taking only occasionally for anxiety. Pt reports mother's recent bout with lung cancer has been a difficult time for her, stressful. Pt has mother who had breast cancer, esophageal cancer and surgery for lung cancer on April 22. Last year had mammogram. Recommend for a breast MRI. Pt is sent for MRI with and without constrast.    Pt reports she is doing okay overall. Hbga1c is 5.3% and well controlled. BP is stable 111/57, continue current therapy.         HISTORY:  Allergies   Allergen Reactions    Atorvastatin Other (See Comments)     Body aches    Azithromycin Itching    Codeine Hives and Itching    Erythromycin Itching and Other (See Comments)     Yeast infection    Fluticasone-Salmeterol Hives    Lisinopril Other (See Comments)    Clopidogrel Rash     Rash    Ipratropium Palpitations     Jitteriness      Past Medical History:   Diagnosis Date    Albuminuria     Anxiety and depression     Bilateral cataracts     Chronic obstructive pulmonary disease (HCC)     Colon polyps     Coronary artery disease     Essential hypertension     GERD (gastroesophageal reflux disease)     Hypercholesteremia     Migraine headache     Morbid obesity (HCC)     Multiple thyroid nodules     Non-ST elevated myocardial infarction ([facility] 2019    Primary hypothyroidism     Transient ischemic attack (TIA)     Type 2 diabetes mellitus ([facility]      Past Surgical History:   Procedure Laterality Date    ADENOIDECTOMY      [facility]    CORONARY ANGIOPLASTY WITH STENT PLACEMENT  2019    NSTEMI & PCI    HYSTERECTOMY, TOTAL ABDOMINAL  1989    ovaries intact    TONSILLECTOMY       Family History   Problem Relation Age of Onset   [facility] Breast Cancer Mother     Colon Polyps Mother     Depression Mother     Thyroid Disease Mother         hypothyroidism    Cancer Father         liver    Depression Father     Hypertension Maternal Grandmother     Thyroid Cancer Son         treated with surgery and radioactive iodine     Social History     Socioeconomic History    Marital status:      Spouse name: Not on file    Number of children: Not on file    Years of education: Not on file    Highest education level: Not on file   Occupational History    Not on file   Tobacco Use    Smoking status: Former Smoker     Packs/day: 1.50     Quit date: 10/6/2019     Years since quittin.6    Smokeless tobacco: Never Used   Vaping Use    Vaping Use: Never used   Substance and Sexual Activity    Alcohol use: No     Alcohol/week: 0.0 standard drinks    Drug use: No    Sexual activity: Not on file   Other Topics Concern    Not on file   Social History Narrative    Not on file     Social Determinants of Health     Financial Resource Strain:     Difficulty of Paying Living Expenses: Not on file   Food Insecurity:     Worried About Running Out of Food in the Last Year: Not on file    Rodolfo of Food in the Last Year: Not on file   Transportation Needs:     Lack of Transportation (Medical): Not on file    Lack of Transportation (Non-Medical): Not on file   Physical Activity:     Days of Exercise per Week: Not on file    Minutes of Exercise per Session: Not on file   Stress:     Feeling of Stress : Not on file   Social Connections:     Frequency of Communication with Friends and Family: Not on file    Frequency of Social Gatherings with Friends and Family: Not on file    Attends Church Services: Not on file    Active Member of [address] [place] [address] [employer] or Organizations: Not on file    Attends Club or Organization Meetings: Not on file    Marital Status: Not on file   Intimate Partner Violence:     Fear of Current or Ex-Partner: Not on file    Emotionally Abused: Not on file    Physically Abused: Not on file    Sexually Abused: Not on file   Housing Stability:     Unable to Pay for Housing in the Last Year: Not on file    Number of [place] in the Last Year: Not on file    Unstable Housing in the Last Year: Not on file     Current Outpatient Medications   Medication Sig Dispense Refill    LORazepam (ATIVAN) 1 MG tablet Take 1 tablet by mouth every 8 hours as needed for Anxiety for up to 30 days.  TAKE 1 TABLET BY MOUTH EVERY 4 HOURS AS NEEDED FOR ANXIETY (MAX DAILY AMOUNT 6 TABLETS) 30 tablet 0    levothyroxine (SYNTHROID) 25 MCG tablet Take 1 tablet by mouth every morning (before breakfast) 90 tablet 3    metFORMIN (GLUCOPHAGE) 500 MG tablet Take 1 tablet by mouth 2 times daily (with meals) 180 tablet 3    OZEMPIC, 0.25 OR 0.5 MG/DOSE, 2 MG/1.5ML SOPN Inject 0.5 mg into the skin once a week 4.5 mL 11    pravastatin (PRAVACHOL) 40 MG tablet Take 1 tablet by mouth daily 90 tablet 3    REPATHA SURECLICK 269 MG/ML SOAJ Inject 140 mg into the skin every 14 days 6 mL 3    spironolactone (ALDACTONE) 25 MG tablet Take 1 tablet by mouth daily 90 tablet 3    olmesartan (BENICAR) 5 MG tablet Take 1 tablet by mouth daily 90 tablet 3    albuterol sulfate  (90 Base) MCG/ACT inhaler Inhale 2 puffs into the lungs every 4 hours as needed      aspirin 81 MG EC tablet Take 81 mg by mouth daily      cetirizine (ZYRTEC) 10 MG tablet Take by mouth      guaiFENesin 1200 MG TB12 Take 1,200 mg by mouth 2 times daily      nitroGLYCERIN (NITROSTAT) 0.4 MG SL tablet Place 0.4 mg under the tongue      umeclidinium-vilanterol (ANORO ELLIPTA) 62.5-25 MCG/INH AEPB inhaler Inhale 1 puff into the lungs daily       No current facility-administered medications for this visit. REVIEW OF SYSTEMS:  Review of systems is as indicated in HPI otherwise negative. PHYSICAL EXAM:  Vital Signs - BP (!) 111/57 (Site: Left Upper Arm, Position: Sitting, Cuff Size: Large Adult)   Temp 97.7 °F (36.5 °C)   Resp 18   Ht 5' 1.5\" (1.562 m)   Wt 252 lb (114.3 kg)   SpO2 94%   BMI 46.84 kg/m²      Physical Exam  Vitals and nursing note reviewed. Constitutional:       Appearance: Normal appearance. She is obese. Comments: General recheck, needs Mammogram MRI   HENT:      Head: Normocephalic and atraumatic. Nose: Nose normal.      Mouth/Throat:      Mouth: Mucous membranes are moist.      Pharynx: Oropharynx is clear. Eyes:      Extraocular Movements: Extraocular movements intact. Conjunctiva/sclera: Conjunctivae normal.      Pupils: Pupils are equal, round, and reactive to light. Cardiovascular:      Rate and Rhythm: Normal rate and regular rhythm. Pulses: Normal pulses. Heart sounds: Normal heart sounds. Pulmonary:      Effort: Pulmonary effort is normal.      Comments: Coarse breath sounds, but otherwise mostly clear to ascultation bilaterally  Abdominal:      General: Bowel sounds are normal.      Comments: Morbidly obese, BMI 46.84, weight 252lbs. Musculoskeletal:         General: Normal range of motion. Cervical back: Normal range of motion and neck supple. Skin:     General: Skin is warm and dry. Capillary Refill: Capillary refill takes 2 to 3 seconds. Neurological:      General: No focal deficit present. Mental Status: She is alert and oriented to person, place, and time. Psychiatric:         Behavior: Behavior normal.         Thought Content: Thought content normal.         Judgment: Judgment normal.      Comments: General health anxiety             LABS  No results found for this visit on 05/31/22. IMPRESSION/PLAN     Diagnosis Orders   1. Fibrocystic breast changes of both breasts  MRI BREAST BILATERAL W WO CONTRAST   2. Family history of breast cancer in mother  MRI BREAST BILATERAL W WO CONTRAST   3. Anxiety  LORazepam (ATIVAN) 1 MG tablet   4. Essential hypertension     5. Chronic obstructive pulmonary disease, unspecified [facility] ([facility]         No follow-up provider specified. Krista Ross MD            Dictated using voice recognition software.  Proofread, but unrecognized voice recognition errors may exist.

## 2022-07-15 DIAGNOSIS — R80.9 ALBUMINURIA: ICD-10-CM

## 2022-07-15 DIAGNOSIS — I10 ESSENTIAL HYPERTENSION: ICD-10-CM

## 2022-07-15 NOTE — TELEPHONE ENCOUNTER
Requested Prescriptions     Pending Prescriptions Disp Refills    olmesartan (BENICAR) 5 MG tablet [Pharmacy Med Name: OLMESARTAN MEDOXOMIL TABS 5MG] 90 tablet 3     Sig: TAKE 1 TABLET DAILY

## 2022-07-18 DIAGNOSIS — R80.9 ALBUMINURIA: ICD-10-CM

## 2022-07-18 DIAGNOSIS — I10 ESSENTIAL HYPERTENSION: ICD-10-CM

## 2022-07-18 RX ORDER — OLMESARTAN MEDOXOMIL 5 MG/1
TABLET ORAL
Qty: 90 TABLET | Refills: 3 | Status: SHIPPED | OUTPATIENT
Start: 2022-07-18

## 2022-07-18 RX ORDER — OLMESARTAN MEDOXOMIL 5 MG/1
5 TABLET ORAL DAILY
Qty: 90 TABLET | Refills: 3 | Status: SHIPPED | OUTPATIENT
Start: 2022-07-18 | End: 2022-10-04

## 2022-07-25 ENCOUNTER — TELEPHONE (OUTPATIENT)
Dept: PULMONOLOGY | Age: 62
End: 2022-07-25

## 2022-07-25 NOTE — TELEPHONE ENCOUNTER
Direct conversation with Remy Bahena NP who states that if pt's SAT is appropriate and within the 5 day window, she can have Paxlovid BID x5 days. This has been pended, can be approved if appropriate. Pt needs to have a Triage call assessment.

## 2022-07-25 NOTE — TELEPHONE ENCOUNTER
TRIAGE CALL      Complaint: Tested positive for Covid  Cough: yes  Productive:  no  Bloody Sputum:  no  Increased SOB/Wheezing:  yes  Duration: 2 days  Fever/Chills: no  OTC Meds tried: mucinex/Cold & sinus

## 2022-07-26 ENCOUNTER — PATIENT MESSAGE (OUTPATIENT)
Dept: PULMONOLOGY | Age: 62
End: 2022-07-26

## 2022-07-26 NOTE — TELEPHONE ENCOUNTER
Spoke with patient who states that she \"feels like crud\". She tested positive at home 7/25, Sx began 7/24.  is COVID+. Reviewed Sx: SOB, wheezing, fevers, congestion. Using her albuterol more. Unable to check SAT. Pt states she is currently in line at [place] [id number] to get a COVID test. Paxlovid reviewed and sent to preferred pharmacy. Pt states she will only  the rx if she is positive. She has a appt next week with NP, this appt was made VV.

## 2022-07-26 NOTE — TELEPHONE ENCOUNTER
From: Maci Danielle  To: Symone Saldana  Sent: 7/26/2022 1:23 PM EDT  Subject: COVID test results    As per phone conversation with Dominique Morillo today, my positive COVID test result from [facility] is attached. Thank you so much for everything you have done. I look forward to our virtual appointment on Tuesday, August 2.

## 2022-08-01 NOTE — PROGRESS NOTES
[address], [address] [facility]  [phone]          Patient Name:  Mikey Cabot  YOB: 1960  MRN:  [id number]      Virtual visit 8/2/2022      Chief Complaint   Patient presents with    COPD       HISTORY OF PRESENT ILLNESS:    The patient is seen by synchronous (real-time) audio-video technology via doxy. me. Consent:  The patient/healthcare decision maker is aware that this patient-initiated Telehealth encounter is a billable service, with coverage as determined by his insurance carrier. The patient is aware that he/she may receive a bill and has provided verbal consent to proceed: YES     I was at [facility] while conducting this visit. The patient is a 63year old female who is seen for follow up of COPD. Has a history of anxiety, CAD, NSTEMI, depression, DM, and GERD. She has a 60 pack year history  of cigarette smoking, but quit 10/2019. Allergies have been controlled on current therapy. Had screening CT 2/21/22 which did not reveal any worrisome findings for lung cancer. Tested positive for Covid on 7/25/22. Took Paxlovid. Is feeling better. Continues to have some head and chest congestion, but this is improving. COLEMAN is improving. No wheezing. Remains on Anoro. Used albuterol qid on a regular basis for the first 4 days, but has been able to decrease use over the past 1-2 days. Review of Systems   Constitutional:  Positive for fatigue. Negative for chills, diaphoresis and fever. HENT:  Positive for tinnitus. Cardiovascular:  Negative for chest pain, palpitations and leg swelling. Gastrointestinal:  Negative for abdominal pain, constipation, diarrhea, nausea and vomiting. Neurological:  Positive for dizziness. Negative for tremors, seizures, syncope, weakness and headaches. PHYSICAL EXAMINATION:  Vital Signs: (As obtained by patient/caregiver at home)  No flowsheet data found. Constitutional: Appears well-developed and well-nourished. No apparent distress. Mental status: Awake and alert. Oriented to person, place, and time. Able to follow commands. Eyes:   EOM normal.  Sclera normal.  No visible discharge. HENT: Normocephalic, atraumatic. Oral mucous membranes appear moist.  External ears are normal.    Neck: No visualized mass. Pulmonary/Chest: Normal respiratory effort. No visualized signs of difficulty breathing or respiratory distress. Neurological:   No facial asymmetry (cranial nerve VII motor function-limited exam due to video visit). Skin: No significant skin lesions or discoloration noted on facial skin. Psychiatric: Normal affect. No hallucinations. DIAGNOSTIC TESTS:   Imaging:  No results found for this or any previous visit from the past 365 days. Spirometry:    No flowsheet data found. ASSESSMENT/PLAN:  (Medical Decision Making)  Below is the assessment and plan developed based upon review of pertinent history, physical exam, labs, studies, and medications. Encounter Diagnoses   Name Primary? Chronic obstructive pulmonary disease, unspecified COPD type ([facility]  --continue Anoro qd   Yes    COVID  --discussed symptomatic management. --continue Mucinex 1200 mg bid until congestion resolves. --obtain pulse oximeter. Report to ER if sats consistently belor 90%. Allergic rhinitis, unspecified seasonality, unspecified trigger  --continue Zyrtec qhs           We discussed the expected course, resolution and complications of the diagnosis(es) in detail. Medication risks, benefits, costs, interactions, and alternatives were discussed as indicated. I advised the patient to contact the office if his/her condition worsens, changes or fails to improve as anticipated. The patient expressed understanding with the diagnosis(es) and plan.       Heather Dunlap was evaluated through a synchronous (real-time) audio-video encounter. The patient (or guardian if applicable) is aware that this is a billable service, which includes applicable co-pays. This Virtual Visit was conducted with patient's (and/or legal guardian's) consent. The visit was conducted pursuant to the emergency declaration under the [address] [place] [address] and the [employer] General Act. Patient identification was verified, and a caregiver was present when appropriate. The patient was located in a state where the provider was licensed to provide care. Services were provided through a video synchronous discussion virtually to substitute for in-person clinic visit. No orders of the defined types were placed in this encounter. Orders Placed This Encounter   Medications    umeclidinium-vilanterol (ANORO ELLIPTA) 62.5-25 MCG/INH AEPB inhaler     Sig: Inhale 1 puff into the lungs in the morning. Dispense:  3 each     Refill:  3    albuterol sulfate HFA (PROVENTIL;VENTOLIN;PROAIR) 108 (90 Base) MCG/ACT inhaler     Sig: Inhale 2 puffs into the lungs every 4 hours as needed for Wheezing or Shortness of Breath     Dispense:  3 each     Refill:  3      Follow up with me in 2 months with spirometry. Collaborating physician is Dr. Marissa Caraballo. Total time:  24 minutes    Von Islas NP, APRN - CNP  Electronically signed        Dictated using voice recognition software.   Proof read but unrecognized errors may exist.

## 2022-08-02 ENCOUNTER — TELEMEDICINE (OUTPATIENT)
Dept: PULMONOLOGY | Age: 62
End: 2022-08-02
Payer: COMMERCIAL

## 2022-08-02 DIAGNOSIS — J44.9 CHRONIC OBSTRUCTIVE PULMONARY DISEASE, UNSPECIFIED COPD TYPE (HCC): Primary | ICD-10-CM

## 2022-08-02 DIAGNOSIS — J30.9 ALLERGIC RHINITIS, UNSPECIFIED SEASONALITY, UNSPECIFIED TRIGGER: ICD-10-CM

## 2022-08-02 DIAGNOSIS — U07.1 COVID: ICD-10-CM

## 2022-08-02 PROCEDURE — 99214 OFFICE O/P EST MOD 30 MIN: CPT | Performed by: NURSE PRACTITIONER

## 2022-08-02 RX ORDER — UMECLIDINIUM BROMIDE AND VILANTEROL TRIFENATATE 62.5; 25 UG/1; UG/1
1 POWDER RESPIRATORY (INHALATION) DAILY
Qty: 3 EACH | Refills: 3 | Status: SHIPPED | OUTPATIENT
Start: 2022-08-02

## 2022-08-02 RX ORDER — ALBUTEROL SULFATE 90 UG/1
2 AEROSOL, METERED RESPIRATORY (INHALATION) EVERY 4 HOURS PRN
Qty: 3 EACH | Refills: 3 | Status: SHIPPED | OUTPATIENT
Start: 2022-08-02

## 2022-08-02 ASSESSMENT — ENCOUNTER SYMPTOMS
CONSTIPATION: 0
VOMITING: 0
ABDOMINAL PAIN: 0
NAUSEA: 0
DIARRHEA: 0

## 2022-08-19 DIAGNOSIS — I10 ESSENTIAL HYPERTENSION: ICD-10-CM

## 2022-08-19 RX ORDER — SPIRONOLACTONE 25 MG/1
25 TABLET ORAL DAILY
Qty: 90 TABLET | Refills: 1 | Status: SHIPPED | OUTPATIENT
Start: 2022-08-19

## 2022-08-19 NOTE — TELEPHONE ENCOUNTER
Pt came to [address] today thought she had an appointment with Dr. Odette Polanco was on 8/16 got rescheduled pt just needs more refills of her sp[facility]olactone [facility] she is able to come to her appointment in October.

## 2022-09-07 DIAGNOSIS — I25.10 CHRONIC CORONARY ARTERY DISEASE: ICD-10-CM

## 2022-09-07 DIAGNOSIS — E11.9 TYPE 2 DIABETES MELLITUS WITHOUT COMPLICATION, WITHOUT LONG-TERM CURRENT USE OF INSULIN (HCC): ICD-10-CM

## 2022-09-07 RX ORDER — SEMAGLUTIDE 1.34 MG/ML
INJECTION, SOLUTION SUBCUTANEOUS
Qty: 4.5 ML | Refills: 3 | OUTPATIENT
Start: 2022-09-07

## 2022-09-07 RX ORDER — SEMAGLUTIDE 1.34 MG/ML
0.5 INJECTION, SOLUTION SUBCUTANEOUS WEEKLY
Qty: 4.5 ML | Refills: 11 | OUTPATIENT
Start: 2022-09-07

## 2022-09-12 DIAGNOSIS — E11.9 TYPE 2 DIABETES MELLITUS WITHOUT COMPLICATION, WITHOUT LONG-TERM CURRENT USE OF INSULIN (HCC): ICD-10-CM

## 2022-09-12 DIAGNOSIS — I25.10 CHRONIC CORONARY ARTERY DISEASE: ICD-10-CM

## 2022-09-12 RX ORDER — SEMAGLUTIDE 1.34 MG/ML
INJECTION, SOLUTION SUBCUTANEOUS
Qty: 4.5 ML | Refills: 0 | Status: SHIPPED | OUTPATIENT
Start: 2022-09-12

## 2022-09-12 NOTE — TELEPHONE ENCOUNTER
Per patient [facility] doesn't have an Ozempic rx. We will resend.    Last OV: 5/23/22  Next OV: 11/30/22

## 2022-10-03 NOTE — PROGRESS NOTES
Patient Name:  Dean Perry                             YOB: 1960  MRN: [id number]                                              Office Visit 10/4/2022    ASSESSMENT AND PLAN:  (Medical Decision Making)    Mecca Downing was seen today for copd. Diagnoses and all orders for this visit:    Chronic obstructive pulmonary disease, unspecified COPD type ([facility]  --significant smoking history, but spirometry is restricted. Continue to Anoro qd. Will obtain CPFTs prior to next appointment. -     Spirometry Without Bronchodilator    Obstructive sleep apnea (adult) (pediatric)  --further work up is needed in this patient with DHS, fatigue, snoring, obesity, abnormal pharyngeal exam.  Will arrange for HST. -     Ambulatory Referral to Sleep Studies    Allergic rhinitis, unspecified seasonality, unspecified trigger  --controlled on Zyrtec. Morbid (severe) obesity due to excess calories (HCC)  --Weight loss is advised. Discussed portion control and increased activity level. Sugar and carbohydrate restriction discussed. Recommended lean protein emphasis in diet, with elimination of sugared beverages including soda pop and juices. Decrease carbs such as pasta, rice, cereal, bread, potatoes emphasized. Whole wheat and multigrain to replace all processed flour. Suggested [facility] Diet as a guideline. Personal history of tobacco use  --Discussed with patient current guidelines for screening for lung cancer. Current recommendations are to obtain yearly screening LDCT yearly from age 49-80, or until smoke free for 15 years. Patient has 60 pack year history of cigarette smoking and currently smoke free since 2019. Discussed with patient risks and benefits of screening, including over-diagnosis, false positive rate, and total radiation exposure. Patient currently exhibits no signs or symptoms suggestive of lung cancer.   Discussed with patient importance of compliance with yearly annual lung cancer screenings and willingness to undergo diagnosis and treatment if screening scan is positive. In addition, patient was counseled regarding remaining smoke free. -     VA VISIT TO DISCUSS LUNG CA SCREEN W LDCT  -     CT Lung Screen (Annual); Future    No orders of the defined types were placed in this encounter. Procedures    VA VISIT TO DISCUSS LUNG CA SCREEN W LDCT     Follow-up and Dispositions    Return in about 6 months (around 4/4/2023) for Tori, COPD, CPFTs close to time of next appt, Arrive 15 minutes prior to appt time. Collaborating physician is Dr. Heidi Payan. SB    Radha Randolph NP, APRN - CNP      _________________________________________________________________________    HISTORY OF PRESENT ILLNESS:    Ms. David Stone is a 63 y.o. female who is seen at [facility] for  COPD     The patient is a 63year old female who is seen for follow up of COPD. Has a history of anxiety, CAD, NSTEMI, depression, DM, and GERD. She has a 60 pack year history of cigarette smoking, but quit 10/2019. Allergies have been controlled on current therapy. Had screening CT 2/21/22 which did not reveal any worrisome findings for lung cancer. Needs screening CT in February, 2023. No cough or wheezing. Remains on Anoro once daily. Tested positive for Covid on 7/25/22. Took Paxlovid and feels back to baseline. Has ongoing DHS, fatigue, and snoring. Had PSG in 2018 which reportedly revealed borderline TACHO and she decided against CPAP. However, she has gained weight and feels worse now than in 2018. She is frustrated about her weight. Continues to exercise, but has been unable to lose weight. REVIEW OF SYSTEMS: 10 point review of systems is negative except as reported in HPI. PHYSICAL EXAM: Body mass index is 47.7 kg/m².   Vitals:    10/04/22 0830   BP: 132/62   Pulse: 95   Resp: 17   Temp: (!) 96.6 °F (35.9 °C)   SpO2: 97%   Weight: 255 lb 14.4 oz (116.1 kg)   Height: 5' 1.42\" (1.56 m)         General:   Alert, cooperative, no distress, appears stated age. Eyes:   Conjunctivae/corneas clear. PERRL        Mouth/Throat:  Lips, mucosa, and tongue normal. Teeth and gums normal.        Lungs:     Breath sounds minimally decreased bilaterally, but clear     Heart:   Regular rate and rhythm, S1, S2 normal, no murmur, click, rub or gallop. Abdomen:    Soft, non-tender. Extremities:  Extremities normal, atraumatic, no cyanosis or edema. Skin:  Skin color normal. No rashes or lesions     Neurologic:  A&Ox3     DIAGNOSTIC TESTS:                                                                                    LABS:   Lab Results   Component Value Date/Time    WBC 6.3 05/27/2021 08:35 AM    HGB 14.2 05/27/2021 08:35 AM    HCT 43.4 05/27/2021 08:35 AM     05/27/2021 08:35 AM    TSH 2.300 11/17/2021 08:39 AM     Imaging: I performed an independent interpretation of the patient's images. CXR:   XR CHEST STANDARD TWO VW 05/26/2020    Narrative  CHEST X-RAY, 2 views. HISTORY:  Pneumonia, follow-up. TECHNIQUE: PA and lateral views. COMPARISON: June 2019. FINDINGS:  -Lungs: are clear.  -Costophrenic angles: are sharp.  -Heart size: is normal.  -Pulmonary vasculature: is unremarkable. -Included portion of the upper abdomen: is unremarkable. -Bones: No gross bony lesions.  -Other: None. Impression  IMPRESSION: Negative for acute abnormality. CT Chest:   CT LUNG SCREENING 02/21/2022    Narrative  Exam: CT LOW DOSE LUNG CANCER SCREENING on 2/21/2022 4:19 PM    Clinical History: The Female patient is 63years old  presenting for smoker  screening. Pack-year history: 50  Current smoker: No  Any current signs or symptoms of lung carcinoma: No    Technique:  Transaxial CT imaging from the thoracic inlet through the lung bases was  performed. Low-dose protocol was implemented. (LDCT) Slice thickness is 3.96 mm.     All CT scans at this facility are performed using dose reduction/dose modulation  techniques, as appropriate the performed exam, including the following:  Automated Exposure Control; Adjustment of the mA and/or kV according to patient  size (this includes techniques or standardized protocols for targeted exams  where dose is matched to indication/reason for exam); and Use of Iterative  Reconstruction Technique. Total radiation dose: 149 mGy-cm    Comparison:  Chest CT 7/31/2018. Findings:  Note: A negative screening exam does not mean an individual does not have lung  cancer. Images through the lungs demonstrate no evidence of pulmonary nodule or mass. No  effusions are identified. Normal vasculature is demonstrated allowing for noncontrast examination. No  evidence of aortic aneurysmal dilatation is seen. The heart and mediastinum are grossly unremarkable. No significant hilar or  mediastinal lymphadenopathy is demonstrated. Images chest wall structures as well as visualized portions of the upper abdomen  are unremarkable in appearance. There are no acute osseous abnormalities. Impression  1. No evidence of clinically significant pulmonary nodule. LUNG RADS CATEGORY: L1 Negative:  No nodules or nodules with specific calcifications such as complete, central,  popcorn, concentric rings, and fat containing nodules. RECOMMENDATION:  L1: Continue annual screening with noncontrast chest CT using  LDCT protocol in 12 months. Please note that a negative exam does not mean the  patient doesn't have lung cancer. ADDITIONAL MODIFIERS:  None    Nuclear Medicine: No results found for this or any previous visit from the past 3650 days.     PFTs:   Office Spirometry Results Latest Ref Rng & Units 10/4/2022   FVC L 2.25   FEV1 L 1.67   FEV1 %PRED-PRE % 71   FVC %PRED-PRE % 75   FEV1/FVC % 74       Echo:   TRANSTHORACIC ECHOCARDIOGRAM (TTE) COMPLETE (CONTRAST/BUBBLE/3D PRN) 02/03/2022    Narrative  This is a summary report. The complete report is available in the patient's medical record. If you cannot access the medical record, please contact the sending organization for a detailed fax or copy. Technical qualifiers: Echo study was technically difficult with poor endocardial visualization. Left Ventricle: Left ventricle size is normal. Normal wall thickness. Normal wall motion. Normal left ventricular systolic function with a visually estimated EF of 60 - 65%. No significant valve disease.     [facility] Reference Info:                                                                                                                  Past Medical History:   Diagnosis Date    Albuminuria     Anxiety and depression     Bilateral cataracts     Chronic obstructive pulmonary disease (HCC)     Colon polyps     Coronary artery disease     Essential hypertension     GERD (gastroesophageal reflux disease)     Hypercholesteremia     Migraine headache     Morbid obesity (HCC)     Multiple thyroid nodules     Non-ST elevated myocardial infarction ([facility] 2019    Primary hypothyroidism     Transient ischemic attack (TIA)     Type 2 diabetes mellitus (HCC)         Tobacco Use      Smoking status: Former        Packs/day: 1.50        Years: 40.00        Pack years: 61        Types: Cigarettes        Quit date: 10/6/2019        Years since quittin.9      Smokeless tobacco: Never    Allergies   Allergen Reactions    Atorvastatin Other (See Comments)     Body aches    Azithromycin Itching    Codeine Hives and Itching    Erythromycin Itching and Other (See Comments)     Yeast infection    Fluticasone-Salmeterol Hives    Lisinopril Other (See Comments)    Clopidogrel Rash     Rash    Ipratropium Palpitations     Jitteriness      Current Outpatient Medications   Medication Instructions    albuterol sulfate HFA (PROVENTIL;VENTOLIN;PROAIR) 108 (90 Base) MCG/ACT inhaler 2 puffs, Inhalation, EVERY 4 HOURS PRN    aspirin 81 mg, Oral, DAILY cetirizine (ZYRTEC) 10 MG tablet Oral    guaiFENesin 1,200 mg, Oral, 2 TIMES DAILY    levothyroxine (SYNTHROID) 25 mcg, Oral, DAILY BEFORE BREAKFAST    metFORMIN (GLUCOPHAGE) 500 mg, Oral, 2 TIMES DAILY WITH MEALS    nitroGLYCERIN (NITROSTAT) 0.4 mg    olmesartan (BENICAR) 5 MG tablet TAKE 1 TABLET DAILY    OZEMPIC, 0.25 OR 0.5 MG/DOSE, 2 MG/1.5ML SOPN Inject 0.5 mg into the skin once a week    pravastatin (PRAVACHOL) 40 mg, Oral, DAILY    Repatha SureClick 211 mg, SubCUTAneous, EVERY 14 DAYS    spironolactone (ALDACTONE) 25 mg, Oral, DAILY    umeclidinium-vilanterol (ANORO ELLIPTA) 62.5-25 MCG/INH AEPB inhaler 1 puff, Inhalation, DAILY

## 2022-10-04 ENCOUNTER — OFFICE VISIT (OUTPATIENT)
Dept: PULMONOLOGY | Age: 62
End: 2022-10-04
Payer: COMMERCIAL

## 2022-10-04 VITALS
TEMPERATURE: 96.6 F | DIASTOLIC BLOOD PRESSURE: 62 MMHG | BODY MASS INDEX: 48.31 KG/M2 | HEART RATE: 95 BPM | RESPIRATION RATE: 17 BRPM | SYSTOLIC BLOOD PRESSURE: 132 MMHG | HEIGHT: 61 IN | OXYGEN SATURATION: 97 % | WEIGHT: 255.9 LBS

## 2022-10-04 DIAGNOSIS — J44.9 CHRONIC OBSTRUCTIVE PULMONARY DISEASE, UNSPECIFIED COPD TYPE (HCC): Primary | ICD-10-CM

## 2022-10-04 DIAGNOSIS — E66.01 MORBID (SEVERE) OBESITY DUE TO EXCESS CALORIES (HCC): ICD-10-CM

## 2022-10-04 DIAGNOSIS — Z87.891 PERSONAL HISTORY OF TOBACCO USE: ICD-10-CM

## 2022-10-04 DIAGNOSIS — G47.33 OBSTRUCTIVE SLEEP APNEA (ADULT) (PEDIATRIC): ICD-10-CM

## 2022-10-04 DIAGNOSIS — J30.9 ALLERGIC RHINITIS, UNSPECIFIED SEASONALITY, UNSPECIFIED TRIGGER: ICD-10-CM

## 2022-10-04 LAB
EXPIRATORY TIME: NORMAL
FEF 25-75% %PRED-PRE: NORMAL
FEF 25-75% PRED: NORMAL
FEF 25-75%-PRE: NORMAL
FEV1 %PRED-PRE: 71 %
FEV1 PRED: 2.34 L
FEV1/FVC %PRED-PRE: NORMAL
FEV1/FVC PRED: NORMAL
FEV1/FVC: 74 %
FEV1: 1.67 L
FVC %PRED-PRE: 75 %
FVC PRED: 3.02 L
FVC: 2.25 L
PEF %PRED-PRE: NORMAL
PEF PRED: NORMAL
PEF-PRE: NORMAL

## 2022-10-04 PROCEDURE — 94010 BREATHING CAPACITY TEST: CPT | Performed by: INTERNAL MEDICINE

## 2022-10-04 PROCEDURE — 99214 OFFICE O/P EST MOD 30 MIN: CPT | Performed by: NURSE PRACTITIONER

## 2022-10-04 PROCEDURE — G0296 VISIT TO DETERM LDCT ELIG: HCPCS | Performed by: NURSE PRACTITIONER

## 2022-10-04 ASSESSMENT — PULMONARY FUNCTION TESTS
FEV1/FVC: 74
FEV1_PREDICTED: 2.34
FEV1_PERCENT_PREDICTED_PRE: 71
FVC: 2.25
FVC_PERCENT_PREDICTED_PRE: 75
FVC_PREDICTED: 3.02
FEV1: 1.67

## 2022-10-04 NOTE — PATIENT INSTRUCTIONS
I have ordered screening CT due AFTER 2/22/2023. You should receive a call from scheduling within 2-3 business days. If you do not hear from them, please call [phone] to schedule your test.   What is lung cancer screening? Lung cancer screening is a way in which doctors check the lungs for early signs of cancer in people who have no symptoms of lung cancer. A low-dose CT scan uses much less radiation than a normal CT scan and shows a more detailed image of the lungs than a standard X-ray. The goal of lung cancer screening is to find cancer early, before it has a chance to grow, spread, or cause problems. One large study found that smokers who were screened with low-dose CT scans were less likely to die of lung cancer than those who were screened with standard X-ray. Below is a summary of the things you need to know regarding screening for lung cancer with low-dose computed tomography (LDCT). This is a screening program that involves routine annual screening with LDCT studies of the lung. The LDCTs are done using low-dose radiation that is not thought to increase your cancer risk. If you have other serious medical conditions (other cancers, congestive heart failure) that limit your life expectancy to less than 10 years, you should not undergo lung cancer screening with LDCT. The chance is 20%-60% that the LDCT result will show abnormalities. This would require additional testing which could include repeat imaging or even invasive procedures. Most (about 95%) of \"abnormal\" LDCT results are false in the sense that no lung cancer is ultimately found. Additionally, some (about 10%) of the cancers found would not affect your life expectancy, even if undetected and untreated. If you are still smoking, the single most important thing that you can do to reduce your risk of dying of lung cancer is to quit.     For this screening to be covered by Medicare and most other insurers, strict criteria must be met.  If you do not meet these criteria, but still wish to undergo LDCT testing, you will be required to sign a waiver indicating your willingness to pay for the scan.

## 2022-10-18 ENCOUNTER — TELEPHONE (OUTPATIENT)
Dept: PRIMARY CARE CLINIC | Facility: CLINIC | Age: 62
End: 2022-10-18

## 2022-10-18 NOTE — TELEPHONE ENCOUNTER
Called Mecca Downing to schedule an appt - she did not answer so I left a message for her to call back      ----- Message from [employer] Bridget sent at 10/17/2022  2:31 PM EDT -----  Subject: Appointment Request    Reason for Call: Established Patient Appointment needed: Flu Shot    QUESTIONS    Reason for appointment request? No appointments available during search     Additional Information for Provider?   ---------------------------------------------------------------------------  --------------  [address]  [phone]; OK to leave message on voicemail  ---------------------------------------------------------------------------  --------------  SCRIPT ANSWERS  COVID Screen: [address]

## 2022-10-24 ENCOUNTER — HOSPITAL ENCOUNTER (OUTPATIENT)
Dept: SLEEP CENTER | Age: 62
Discharge: HOME | End: 2022-10-27
Payer: COMMERCIAL

## 2022-10-24 PROCEDURE — 95806 SLEEP STUDY UNATT&RESP EFFT: CPT

## 2022-11-04 ENCOUNTER — OFFICE VISIT (OUTPATIENT)
Dept: ORTHOPEDIC SURGERY | Age: 62
End: 2022-11-04
Payer: COMMERCIAL

## 2022-11-04 DIAGNOSIS — M25.511 RIGHT SHOULDER PAIN, UNSPECIFIED CHRONICITY: Primary | ICD-10-CM

## 2022-11-04 DIAGNOSIS — M75.41 IMPINGEMENT SYNDROME OF RIGHT SHOULDER: ICD-10-CM

## 2022-11-04 PROCEDURE — 99203 OFFICE O/P NEW LOW 30 MIN: CPT | Performed by: SPECIALIST/TECHNOLOGIST

## 2022-11-04 RX ORDER — DICLOFENAC SODIUM 75 MG/1
75 TABLET, DELAYED RELEASE ORAL 2 TIMES DAILY
Qty: 60 TABLET | Refills: 0 | Status: SHIPPED | OUTPATIENT
Start: 2022-11-04 | End: 2022-11-30 | Stop reason: ALTCHOICE

## 2022-11-04 NOTE — PROGRESS NOTES
Name: Zane Ureña  YOB: 1960  Gender: female  MRN: [id number]      CC: Shoulder Pain (RIght)       HPI: Zane Ureña is a 63 y.o. female who presents with Shoulder Pain (RIght)  Mrs. Danielle is a new patient who presents with 3-4 months of ongoing shoulder pain. She denies any mechanism of injury but complains today of frequent popping in the shoulder. She notes that since reducing her time in the gym, she has noticed that her pain has become primarily intermittent with occasional weakness. She denies seeking any formal treatment to address her pain prior to today. She has been managing with Tylenol and Ibuprofen as needed. Reports that she has tried some exercises at [employer] guided by a  which has increasing pain in her shoulder. ROS/Meds/PSH/PMH/FH/SH: I personally reviewed the patients standard intake form. Below are the pertinents    Tobacco:  reports that she quit smoking about 3 years ago. Her smoking use included cigarettes. She has a 63.00 pack-year smoking history. She has never used smokeless tobacco.  Diabetes: diabetic-non insulin  Other: History of TIA, NSTEMI, hypertension, COPD    Physical Examination:  General: no acute distress  Lungs: breathing easily  CV: regular rhythm by pulse  Right Shoulder: No obvious deformity of the biceps. No tenderness to palpation. Active forward flexion to 170 degrees with mild pain in the extreme. External rotation with elbows at side equal bilaterally. External rotation with elbows at side resisted range of motion 5/5 strength. Mild pain with empty can testing but 5/5 strength empty can position. Positive Raford Cho, negative Neer's. Pain with Wasatch's, negative speeds. Imaging:   Shoulder XR: Grashey, Axillary and Scapula Yviews     Clinical Indication:  1. Right shoulder pain, unspecified chronicity    2.  Impingement syndrome of right shoulder           Report: Grashey, Axillary and Scapula Y XRs of the Right shoulder demonstrates no acute fracture dislocation or advanced degenerative change    Impression: No acute findings as above        All imaging interpreted by myself MONET Allen independent of radiology review        Assessment:   1. Right shoulder pain, unspecified chronicity         Plan:   The majority of the patient's right shoulder pain is due to subacromial impingement or bursitis. I discussed with the patient conservative treatment options to include corticosteroid injection, formal physical therapy and prescription NSAIDs. The patient does not wish to proceed with corticosteroid injection at this time due to the side effects of blood sugar spikes and her current status is a diabetic. She also reports she does not wish to attend formal physical therapy due to time constraints and cost limitations. I did discuss with the patient I think she would benefit from a trial of oral NSAIDs and I prescribed her 75 mg diclofenac twice daily. Explained to the patient not to take any other NSAID products with this and to take the medication with food. I also provided her with a home exercise program for rotator cuff strengthening and recommended that she modify her exercise activity to exclude her upper extremity until her symptoms have decreased.         Melvin Ramirez, MS, APRN, FNP-BC  Orthopaedics and Sports Medicine

## 2022-11-23 LAB
ALBUMIN SERPL-MCNC: 4.1 G/DL (ref 3.8–4.9)
ALBUMIN/CREAT UR: 8 MG/G CREAT (ref 0–29)
ALBUMIN/GLOB SERPL: 1.7 {RATIO} (ref 1.2–2.2)
ALP SERPL-CCNC: 62 IU/L (ref 44–121)
ALT SERPL-CCNC: 25 IU/L (ref 0–32)
AST SERPL-CCNC: 18 IU/L (ref 0–40)
BILIRUB SERPL-MCNC: <0.2 MG/DL (ref 0–1.2)
BUN SERPL-MCNC: 15 MG/DL (ref 6–24)
BUN/CREAT SERPL: 15 (ref 9–23)
CALCIUM SERPL-MCNC: 9.2 MG/DL (ref 8.7–10.2)
CHLORIDE SERPL-SCNC: 102 MMOL/L (ref 96–106)
CHOLEST SERPL-MCNC: 102 MG/DL (ref 100–199)
CO2 SERPL-SCNC: 24 MMOL/L (ref 20–29)
CREAT SERPL-MCNC: 0.97 MG/DL (ref 0.57–1)
CREAT UR-MCNC: 126.4 MG/DL
EGFR: 67 ML/MIN/1.73
GLOBULIN SER CALC-MCNC: 2.4 G/DL (ref 1.5–4.5)
GLUCOSE SERPL-MCNC: 92 MG/DL (ref 70–99)
HBA1C MFR BLD: 5.3 % (ref 4.8–5.6)
HDLC SERPL-MCNC: 51 MG/DL
LDLC SERPL CALC-MCNC: 27 MG/DL (ref 0–99)
MICROALBUMIN UR-MCNC: 10 UG/ML
POTASSIUM SERPL-SCNC: 4.2 MMOL/L (ref 3.5–5.2)
PROT SERPL-MCNC: 6.5 G/DL (ref 6–8.5)
SODIUM SERPL-SCNC: 142 MMOL/L (ref 134–144)
SPECIMEN STATUS REPORT: NORMAL
TRIGL SERPL-MCNC: 143 MG/DL (ref 0–149)
TSH SERPL DL<=0.005 MIU/L-ACNC: 4.11 UIU/ML (ref 0.45–4.5)
VLDLC SERPL CALC-MCNC: 24 MG/DL (ref 5–40)

## 2022-11-30 ENCOUNTER — OFFICE VISIT (OUTPATIENT)
Dept: ENDOCRINOLOGY | Age: 62
End: 2022-11-30
Payer: COMMERCIAL

## 2022-11-30 VITALS
BODY MASS INDEX: 48.28 KG/M2 | HEART RATE: 87 BPM | SYSTOLIC BLOOD PRESSURE: 120 MMHG | WEIGHT: 259 LBS | OXYGEN SATURATION: 97 % | DIASTOLIC BLOOD PRESSURE: 70 MMHG

## 2022-11-30 DIAGNOSIS — I25.10 CHRONIC CORONARY ARTERY DISEASE: ICD-10-CM

## 2022-11-30 DIAGNOSIS — E03.9 PRIMARY HYPOTHYROIDISM: Primary | ICD-10-CM

## 2022-11-30 DIAGNOSIS — E11.65 TYPE 2 DIABETES MELLITUS WITH HYPERGLYCEMIA, WITHOUT LONG-TERM CURRENT USE OF INSULIN (HCC): ICD-10-CM

## 2022-11-30 DIAGNOSIS — E04.2 MULTIPLE THYROID NODULES: ICD-10-CM

## 2022-11-30 DIAGNOSIS — E78.00 HYPERCHOLESTEREMIA: ICD-10-CM

## 2022-11-30 DIAGNOSIS — I10 ESSENTIAL HYPERTENSION: ICD-10-CM

## 2022-11-30 DIAGNOSIS — R80.9 ALBUMINURIA: ICD-10-CM

## 2022-11-30 DIAGNOSIS — Z80.8 FAMILY HISTORY OF THYROID CANCER: ICD-10-CM

## 2022-11-30 PROCEDURE — 3074F SYST BP LT 130 MM HG: CPT | Performed by: INTERNAL MEDICINE

## 2022-11-30 PROCEDURE — 3078F DIAST BP <80 MM HG: CPT | Performed by: INTERNAL MEDICINE

## 2022-11-30 PROCEDURE — 99214 OFFICE O/P EST MOD 30 MIN: CPT | Performed by: INTERNAL MEDICINE

## 2022-11-30 PROCEDURE — 3044F HG A1C LEVEL LT 7.0%: CPT | Performed by: INTERNAL MEDICINE

## 2022-11-30 RX ORDER — LEVOTHYROXINE SODIUM 0.03 MG/1
25 TABLET ORAL
Qty: 90 TABLET | Refills: 3
Start: 2022-11-30

## 2022-11-30 RX ORDER — OLMESARTAN MEDOXOMIL 5 MG/1
5 TABLET ORAL DAILY
Qty: 90 TABLET | Refills: 3
Start: 2022-11-30

## 2022-11-30 RX ORDER — LANCETS 33 GAUGE
EACH MISCELLANEOUS
Qty: 300 EACH | Refills: 3 | Status: SHIPPED | OUTPATIENT
Start: 2022-11-30

## 2022-11-30 RX ORDER — SPIRONOLACTONE 25 MG/1
25 TABLET ORAL DAILY
Qty: 90 TABLET | Refills: 1
Start: 2022-11-30 | End: 2022-12-02 | Stop reason: SDUPTHER

## 2022-11-30 RX ORDER — BLOOD SUGAR DIAGNOSTIC
STRIP MISCELLANEOUS
Qty: 300 EACH | Refills: 3 | Status: SHIPPED | OUTPATIENT
Start: 2022-11-30

## 2022-11-30 RX ORDER — EVOLOCUMAB 140 MG/ML
140 INJECTION, SOLUTION SUBCUTANEOUS
Qty: 6 ML | Refills: 3
Start: 2022-11-30 | End: 2022-12-02 | Stop reason: SDUPTHER

## 2022-11-30 RX ORDER — PRAVASTATIN SODIUM 40 MG
40 TABLET ORAL DAILY
Qty: 90 TABLET | Refills: 3
Start: 2022-11-30

## 2022-11-30 RX ORDER — SEMAGLUTIDE 1.34 MG/ML
1 INJECTION, SOLUTION SUBCUTANEOUS WEEKLY
Qty: 3 ML | Refills: 3 | Status: SHIPPED | OUTPATIENT
Start: 2022-11-30

## 2022-11-30 ASSESSMENT — ENCOUNTER SYMPTOMS
TROUBLE SWALLOWING: 0
VOICE CHANGE: 0

## 2022-11-30 NOTE — PROGRESS NOTES
Ward Shirley MD, [address] [facility] [place]            Reason for visit: Follow-up of hypothyroidism, thyroid nodule, type 2 diabetes mellitus      ASSESSMENT AND PLAN:    1. Primary hypothyroidism  She is biochemically euthyroid. Continue levothyroxine as currently prescribed. - levothyroxine (SYNTHROID) 25 MCG tablet; Take 1 tablet by mouth every morning (before breakfast)  Dispense: 90 tablet; Refill: 3  - TSH; Future  - T4, Free; Future    2. Multiple thyroid nodules  Ongoing ultrasound follow-up is not necessary. 3. Family history of thyroid cancer  She previously told me that her son has a history of medullary thyroid cancer. However, he was treated with surgery and radioactive iodine. Only follicular and papillary thyroid carcinomas are treated with radioactive iodine. Thus, I suspect that he had a well differentiated papillary or follicular cancer. Periodic ultrasound is warranted. Because of her family history of thyroid cancer, periodic ultrasound is warranted. 4. Type 2 diabetes mellitus with hyperglycemia, without long-term current use of insulin (HCC)  Glycemic control is quite good. I will increase her Ozempic dose to see if that will help her lose weight. She does not require specialty care for diabetes. Defer ongoing evaluation management to Dr. Kendell Kussmaul.  - metFORMIN (GLUCOPHAGE) 500 MG tablet; Take 1 tablet by mouth 2 times daily (with meals)  Dispense: 180 tablet; Refill: 3  - ONETOUCH VERIO strip; Check blood glucose 3 times daily. Dx E11.9  Dispense: 300 each; Refill: 3  - OneTouch Delica Lancets 93P MISC; Check blood glucose 3 times daily. Dx E11.9  Dispense: 300 each; Refill: 3  - OZEMPIC, 1 MG/DOSE, 4 MG/3ML SOPN; Inject 1 mg into the skin once a week Start after 0.5 mg weekly dose. Dispense: 3 mL; Refill: 3    5.  Essential hypertension  BP Readings from Last 3 Encounters:   11/30/22 120/70   10/04/22 132/62   05/31/22 (!) 111/57     - spironolactone (ALDACTONE) 25 MG tablet; Take 1 tablet by mouth daily  Dispense: 90 tablet; Refill: 1  - olmesartan (BENICAR) 5 MG tablet; Take 1 tablet by mouth daily  Dispense: 90 tablet; Refill: 3    6. Hypercholesteremia  LDL is at target. - pravastatin (PRAVACHOL) 40 MG tablet; Take 1 tablet by mouth daily  Dispense: 90 tablet; Refill: 3  - REPATHA SURECLICK 723 MG/ML SOAJ; Inject 140 mg into the skin every 14 days  Dispense: 6 mL; Refill: 3    7. Albuminuria  Continue angiotensin receptor blocker therapy. - olmesartan (BENICAR) 5 MG tablet; Take 1 tablet by mouth daily  Dispense: 90 tablet; Refill: 3    8. Chronic coronary artery disease  - OZEMPIC, 1 MG/DOSE, 4 MG/3ML SOPN; Inject 1 mg into the skin once a week Start after 0.5 mg weekly dose. Dispense: 3 mL; Refill: 3        Follow-up and Dispositions    Return in about 6 months (around 2023). History of Present Illness:     THYROID DYSFUNCTION  Blank Veloz is seen for follow-up of hypothyroidism; this was diagnosed in 2020. Current symptoms:  See review of systems below     Prior treatment: Levothyroxine 25 mcg daily was started 3/3/2021. Pertinent labs:  2016: TSH 4.180.  2017: TSH 1.990, T4 7.5.  2019: TSH 3.800, free T4 1.03.  2020: TSH 4.510, free T4 0.90.  2021: TSH 5.960, free T4 0.95.  2021: TSH 4.050, free T4 1.04, T3 122.  2021: TSH 2.300, free T4 1. 19.  2022: TSH 2.77.  2022: TSH 4.110. Imagin2021: Ultrasound ([place])- Right lobe 1.91 x 1.46 x 3.74 cm, isthmus 0.42 cm, left lobe 1.30 x 1.50 x 4.05 cm. Homogeneous echotexture. Normal blood flow. 0.37 x 0.61 x 0.58 cm heterogeneous isoechoic nodule without calcifications or increased blood flow in the midportion of the right lobe (TR 3). 0.32 x 0.47 x 0.84 cm complex isoechoic nodule without calcifications or increased blood flow in the isthmus (TR 3).       DIABETES MELLITUS  [facility] [place]  is here for follow-up of type 2 diabetes mellitus. Current regimen: Metformin 500 mg twice daily, Ozempic 0.5 mg weekly     Current symptoms: See review of systems below     Date of diagnosis: December 2017     Diet: No particular diet     Exercise: Minimal     Diabetes education: The patient has not received formal diabetes education. Diabetic complications:                Retinopathy: Last eye exam was 12/6/2021 and demonstrated no diabetic retinopathy. Eye care specialist is Dr. Mari Morrow. Albuminuria/nephropathy:                          8/21/2020: Urine microalbumin to creatinine ratio 753 (+).                          2/24/2021: Urine microalbumin 512.8 (+).                          5/27/2021: Serum creatinine 1.01.                          11/17/2021: Urine microalbumin to creatinine ratio 10 (-), serum creatinine 0.99.    5/18/2022: Urine microalbumin to creatinine ratio 9 (-), serum creatinine 1.00.    11/22/2022: Urine microalbumin to creatinine ratio 8 (-), serum creatinine 0.97. Neuropathy: No suggestive symptoms     Home blood glucose monitoring frequency: 1 times per day     Blood glucose: by review of meter [employer]              fasting mostly 80s-110s              AC lunch not checked               AC supper not checked              bedtime not checked              30-day average ? (range ?-?)     Hypoglycemia: never     Hemoglobin A1c:  12/18/2017: 11.4%.  5/22/2018: 5.5%. 8/23/2018: 5.4%. 2/27/2019: 5.2%. 8/28/2019: 5.4%. 12/11/2019: 5.1%.  8/21/2020: 5.6%. 2/24/2021: 6.7%.  5/27/2021: 5.9%.  7/20/2021: 5.4%. 11/17/2021: 5.4%. 5/18/2022: 5.3%. 11/22/2022: 5.3%. Other pertinent labs:              Lipids:                          11/22/2022: Total cholesterol 102, triglycerides 143, HDL 51, LDL 27.                  Thyroid: See above      Review of Systems   Constitutional:  Positive for unexpected weight change (gained 7 pounds in the last 6 months). Negative for fatigue. HENT:  Negative for trouble swallowing and voice change. Eyes:  Negative for visual disturbance. /70   Pulse 87   Wt 259 lb (117.5 kg)   SpO2 97%   BMI 48.28 kg/m²   Wt Readings from Last 3 Encounters:   11/30/22 259 lb (117.5 kg)   10/04/22 255 lb 14.4 oz (116.1 kg)   05/31/22 252 lb (114.3 kg)       Physical Exam  HENT:      Head: Normocephalic. Neck:      Thyroid: No thyroid mass or thyromegaly. Cardiovascular:      Rate and Rhythm: Normal rate. Pulmonary:      Effort: No respiratory distress. Breath sounds: Normal breath sounds. Neurological:      Mental Status: She is alert. Psychiatric:         Mood and Affect: Mood normal.         Behavior: Behavior normal.       Orders Placed This Encounter   Procedures    TSH     Standing Status:   Future     Standing Expiration Date:   11/30/2023    T4, Free     Standing Status:   Future     Standing Expiration Date:   11/30/2023         Current Outpatient Medications   Medication Sig Dispense Refill    metFORMIN (GLUCOPHAGE) 500 MG tablet Take 1 tablet by mouth 2 times daily (with meals) 180 tablet 3    levothyroxine (SYNTHROID) 25 MCG tablet Take 1 tablet by mouth every morning (before breakfast) 90 tablet 3    pravastatin (PRAVACHOL) 40 MG tablet Take 1 tablet by mouth daily 90 tablet 3    REPATHA SURECLICK 915 MG/ML SOAJ Inject 140 mg into the skin every 14 days 6 mL 3    spironolactone (ALDACTONE) 25 MG tablet Take 1 tablet by mouth daily 90 tablet 1    olmesartan (BENICAR) 5 MG tablet Take 1 tablet by mouth daily 90 tablet 3    ONETOUCH VERIO strip Check blood glucose 3 times daily. Dx E11.9 300 each 3    OneTouch Delica Lancets 41S MISC Check blood glucose 3 times daily. Dx E11.9 300 each 3    OZEMPIC, 1 MG/DOSE, 4 MG/3ML SOPN Inject 1 mg into the skin once a week Start after 0.5 mg weekly dose.  3 mL 3    umeclidinium-vilanterol (ANORO ELLIPTA) 62.5-25 MCG/INH AEPB inhaler Inhale 1 puff into the lungs in the morning. 3 each 3    albuterol sulfate HFA (PROVENTIL;VENTOLIN;PROAIR) 108 (90 Base) MCG/ACT inhaler Inhale 2 puffs into the lungs every 4 hours as needed for Wheezing or Shortness of Breath 3 each 3    aspirin 81 MG EC tablet Take 81 mg by mouth daily      cetirizine (ZYRTEC) 10 MG tablet Take by mouth      guaiFENesin 1200 MG TB12 Take 1,200 mg by mouth 2 times daily       No current facility-administered medications for this visit. Jaspreet Maria MD, FACE      Portions of this note were generated with the assistance of voice recognition software. As such, some errors in transcription may be present.

## 2022-12-02 ENCOUNTER — OFFICE VISIT (OUTPATIENT)
Dept: CARDIOLOGY CLINIC | Age: 62
End: 2022-12-02
Payer: COMMERCIAL

## 2022-12-02 VITALS
WEIGHT: 256 LBS | SYSTOLIC BLOOD PRESSURE: 126 MMHG | HEART RATE: 75 BPM | DIASTOLIC BLOOD PRESSURE: 82 MMHG | BODY MASS INDEX: 48.33 KG/M2 | HEIGHT: 61 IN

## 2022-12-02 DIAGNOSIS — I25.119 ATHEROSCLEROSIS OF NATIVE CORONARY ARTERY OF NATIVE HEART WITH ANGINA PECTORIS (HCC): ICD-10-CM

## 2022-12-02 DIAGNOSIS — I25.10 ATHEROSCLEROSIS OF NATIVE CORONARY ARTERY OF NATIVE HEART WITHOUT ANGINA PECTORIS: ICD-10-CM

## 2022-12-02 DIAGNOSIS — E78.5 HYPERLIPIDEMIA LDL GOAL <70: ICD-10-CM

## 2022-12-02 DIAGNOSIS — I10 ESSENTIAL HYPERTENSION: ICD-10-CM

## 2022-12-02 DIAGNOSIS — E11.9 TYPE 2 DIABETES MELLITUS WITHOUT COMPLICATION, UNSPECIFIED WHETHER LONG TERM INSULIN USE (HCC): ICD-10-CM

## 2022-12-02 DIAGNOSIS — E78.00 HYPERCHOLESTEREMIA: ICD-10-CM

## 2022-12-02 DIAGNOSIS — I20.9 ANGINA PECTORIS, UNSPECIFIED (HCC): ICD-10-CM

## 2022-12-02 DIAGNOSIS — I10 ESSENTIAL (PRIMARY) HYPERTENSION: Primary | ICD-10-CM

## 2022-12-02 PROCEDURE — 3074F SYST BP LT 130 MM HG: CPT | Performed by: INTERNAL MEDICINE

## 2022-12-02 PROCEDURE — 3078F DIAST BP <80 MM HG: CPT | Performed by: INTERNAL MEDICINE

## 2022-12-02 PROCEDURE — 93000 ELECTROCARDIOGRAM COMPLETE: CPT | Performed by: INTERNAL MEDICINE

## 2022-12-02 PROCEDURE — 3044F HG A1C LEVEL LT 7.0%: CPT | Performed by: INTERNAL MEDICINE

## 2022-12-02 PROCEDURE — 99214 OFFICE O/P EST MOD 30 MIN: CPT | Performed by: INTERNAL MEDICINE

## 2022-12-02 RX ORDER — NITROGLYCERIN 0.4 MG/1
0.4 TABLET SUBLINGUAL EVERY 5 MIN PRN
Qty: 25 TABLET | Refills: 3 | Status: SHIPPED | OUTPATIENT
Start: 2022-12-02

## 2022-12-02 RX ORDER — SPIRONOLACTONE 25 MG/1
25 TABLET ORAL DAILY
Qty: 90 TABLET | Refills: 3 | Status: SHIPPED | OUTPATIENT
Start: 2022-12-02

## 2022-12-02 RX ORDER — EVOLOCUMAB 140 MG/ML
140 INJECTION, SOLUTION SUBCUTANEOUS
Qty: 6 ML | Refills: 3 | Status: SHIPPED | OUTPATIENT
Start: 2022-12-02

## 2022-12-02 ASSESSMENT — ENCOUNTER SYMPTOMS
ABDOMINAL PAIN: 0
COUGH: 0
STRIDOR: 0
APHONIA: 0
NAIL CHANGES: 0
EYE PAIN: 0

## 2022-12-02 NOTE — PROGRESS NOTES
[address], [address] [place] [address]  PHONE: [phone]    SUBJECTIVE:   Demetrius See is a 63 y.o. female 1960   seen for a follow up visit regarding the following:     Chief Complaint   Patient presents with    Coronary Artery Disease    Hypertension    6 Month Follow-Up         History of present illness: 63 y.o. female presented for follow-up 12/2/22 history of diabetes hypertension hyperlipidemia. She has been placed on SGL 2 therapy in the past but had perceived weight gain this was discontinued. COLEMAN is worse Had COVID summer 2022. Occasional CP     Cardiac History:   ECG at [facility] 03/2017 - sinus rhythm, poor R-wave progression, negative precordial leads, possible pulmonary disease pattern. Stress perfusion study ordered 03/22/2017. Stress perfusion study 04/2017 - normal perfusion, breast attenuation artifact. 5/2018 Echo normal   5/24/2019 NSTEMI   5/25/2019 PCI to RCA   2/03/2022 Technical qualifiers: Echo study was technically difficult with poor endocardial visualization. Left Ventricle: Left ventricle size is normal. Normal wall thickness. Normal wall motion. Normal left ventricular systolic function with a visually estimated EF of 60 - 65%. No significant valve disease. 2022 Intolerant to Jardeice   12/2/20202 Sinus  Rhythm Low voltage in precordial leads Poor R-wave progression -may be secondary to pulmonary disease   Negative precordial T-waves. Assessment and Plan:    CAD controlled   stopped birlinta in 5/20/21 continue asa   DM  Patient intolerant to SGLT2 therapy due to perceived weight gain  Now on GLP-1    Hyperlipidemia. Repatha pravastatin  Hypertension, controlled.     Tobacco abuse, controlled last cigarette 10/6/2019  Plavix allergy (true allergy)   DM   metFORMIN - 500 MG       Current Outpatient Medications   Medication Sig    spironolactone (ALDACTONE) 25 MG tablet Take 1 tablet by mouth daily REPATHA SURECLICK 583 MG/ML SOAJ Inject 140 mg into the skin every 14 days    metFORMIN (GLUCOPHAGE) 500 MG tablet Take 1 tablet by mouth 2 times daily (with meals)    levothyroxine (SYNTHROID) 25 MCG tablet Take 1 tablet by mouth every morning (before breakfast)    pravastatin (PRAVACHOL) 40 MG tablet Take 1 tablet by mouth daily    olmesartan (BENICAR) 5 MG tablet Take 1 tablet by mouth daily    OZEMPIC, 1 MG/DOSE, 4 MG/3ML SOPN Inject 1 mg into the skin once a week Start after 0.5 mg weekly dose. umeclidinium-vilanterol (ANORO ELLIPTA) 62.5-25 MCG/INH AEPB inhaler Inhale 1 puff into the lungs in the morning. albuterol sulfate HFA (PROVENTIL;VENTOLIN;PROAIR) 108 (90 Base) MCG/ACT inhaler Inhale 2 puffs into the lungs every 4 hours as needed for Wheezing or Shortness of Breath    aspirin 81 MG EC tablet Take 81 mg by mouth daily    cetirizine (ZYRTEC) 10 MG tablet Take by mouth    guaiFENesin 1200 MG TB12 Take 1,200 mg by mouth 2 times daily    ONETOUCH VERIO strip Check blood glucose 3 times daily. Dx E30.7    OneTouch Delica Lancets 33G MISC Check blood glucose 3 times daily. Dx E11.9     No current facility-administered medications for this visit. Past Medical History, Past Surgical History, Family history, Social History, and Medications were all reviewed with the patient today and updated as necessary.        Allergies   Allergen Reactions    Atorvastatin Other (See Comments)     Body aches    Azithromycin Itching    Codeine Hives and Itching    Erythromycin Itching and Other (See Comments)     Yeast infection    Fluticasone-Salmeterol Hives    Lisinopril Other (See Comments)    Clopidogrel Rash     Rash    Ipratropium Palpitations     Jitteriness      Past Medical History:   Diagnosis Date    Albuminuria     Anxiety and depression     Bilateral cataracts     Chronic obstructive pulmonary disease (HCC)     Colon polyps     Coronary artery disease     COVID-19 07/2022    Essential hypertension GERD (gastroesophageal reflux disease)     Hypercholesteremia     Migraine headache     Morbid obesity (HCC)     Multiple thyroid nodules     Non-ST elevated myocardial infarction ([facility] 05/2019    Osteoporosis     Primary hypothyroidism     Transient ischemic attack (TIA) 2006    Type 2 diabetes mellitus ([facility]     Wrist fracture 1970     Past Surgical History:   Procedure Laterality Date    Joann Torres  05/25/2019    NSTEMI & PCI    HYSTERECTOMY, TOTAL ABDOMINAL (CERVIX REMOVED)  1989    ovaries intact    TONSILLECTOMY  1970     Family History   Problem Relation Age of Onset    Breast Cancer Mother     Colon Polyps Mother     Depression Mother     Thyroid Disease Mother         hypothyroidism    Cancer Mother         Breast, esophagus, lung    Cancer Father         Primary liver    Depression Father     Hypertension Maternal Grandmother     Osteoporosis Maternal Grandmother     Thyroid Cancer Son         treated with surgery and radioactive iodine      Social History     Tobacco Use    Smoking status: Former     Packs/day: 1.50     Years: 42.00     Pack years: 63.00     Types: Cigarettes     Quit date: 10/6/2019     Years since quitting: 3.1    Smokeless tobacco: Never   Substance Use Topics    Alcohol use: No       ROS:    Review of Systems   Constitutional: Negative for fever. HENT:  Negative for stridor. Eyes:  Negative for pain. Cardiovascular:  Negative for chest pain. Respiratory:  Negative for cough. Endocrine: Negative for cold intolerance. Skin:  Negative for nail changes. Musculoskeletal:  Negative for arthritis. Gastrointestinal:  Negative for abdominal pain. Genitourinary:  Negative for dysuria. Neurological:  Negative for aphonia. Psychiatric/Behavioral:  Negative for altered mental status. Allergic/Immunologic: Negative for hives.          PHYSICAL EXAM:    /82 Pulse 75   Ht 5' 1\" (1.549 m)   Wt 256 lb (116.1 kg)   BMI 48.37 kg/m²        Wt Readings from Last 3 Encounters:   12/02/22 256 lb (116.1 kg)   11/30/22 259 lb (117.5 kg)   10/04/22 255 lb 14.4 oz (116.1 kg)     BP Readings from Last 3 Encounters:   12/02/22 126/82   11/30/22 120/70   10/04/22 132/62         Physical Exam  Vitals reviewed. HENT:      Head: Normocephalic. Right Ear: External ear normal.      Left Ear: External ear normal.      Nose: Nose normal.   Eyes:      General: No scleral icterus. Pulmonary:      Effort: Pulmonary effort is normal.   Abdominal:      General: There is no distension. Musculoskeletal:      Cervical back: Neck supple. Skin:     General: Skin is warm. Neurological:      Mental Status: She is alert. Mental status is at baseline. Medical problems and test results were reviewed with the patient today.            Recent Results (from the past 672 hour(s))   Comprehensive Metabolic Panel    Collection Time: 11/22/22  8:54 AM   Result Value Ref Range    Glucose 92 70 - 99 mg/dL    BUN 15 6 - 24 mg/dL    Creatinine 0.97 0.57 - 1.00 mg/dL    EGFR 67 >59 mL/min/1.73    BUN/Creatinine Ratio 15 9 - 23    Sodium 142 134 - 144 mmol/L    Potassium 4.2 3.5 - 5.2 mmol/L    Chloride 102 96 - 106 mmol/L    CO2 24 20 - 29 mmol/L    Calcium 9.2 8.7 - 10.2 mg/dL    Total Protein 6.5 6.0 - 8.5 g/dL    Albumin 4.1 3.8 - 4.9 g/dL    Globulin, Total 2.4 1.5 - 4.5 g/dL    Albumin/Globulin Ratio 1.7 1.2 - 2.2    Total Bilirubin <0.2 0.0 - 1.2 mg/dL    Alkaline Phosphatase 62 44 - 121 IU/L    AST 18 0 - 40 IU/L    ALT 25 0 - 32 IU/L   Lipid Panel    Collection Time: 11/22/22  8:54 AM   Result Value Ref Range    Cholesterol 102 100 - 199 mg/dL    Triglycerides 143 0 - 149 mg/dL    HDL 51 >39 mg/dL    VLDL Cholesterol Calculated 24 5 - 40 mg/dL    LDL Calculated 27 0 - 99 mg/dL   Hemoglobin A1C    Collection Time: 11/22/22  8:54 AM   Result Value Ref Range    Hemoglobin A1C 5.3 4.8 - 5.6 %   TSH with Reflex to FT4    Collection Time: 11/22/22  8:54 AM   Result Value Ref Range    TSH 4.110 0.450 - 4.500 uIU/mL   SPECIMEN STATUS REPORT    Collection Time: 11/22/22  8:54 AM   Result Value Ref Range    Specimen Status Report COMMENT    Microalbumin / Creatinine Urine Ratio    Collection Time: 11/22/22  8:54 AM   Result Value Ref Range    Creatinine, Ur 126.4 Not Estab. mg/dL    Albumin, U 10.0 Not Estab. ug/mL    Microalbumin Creatinine Ratio 8 0 - 29 mg/g creat     Lab Results   Component Value Date/Time    CHOL 102 11/22/2022 08:54 AM    CHOL 119 05/18/2022 09:07 AM    HDL 51 11/22/2022 08:54 AM    VLDL 28 11/17/2021 08:39 AM       No results found for any visits on 12/02/22. Dickson Devi was seen today for coronary artery disease, hypertension and 6 month follow-up. Diagnoses and all orders for this visit:    Essential (primary) hypertension  -     EKG 12 Lead    Atherosclerosis of native coronary artery of native heart without angina pectoris  -     EKG 12 Lead    Type 2 diabetes mellitus without complication, unspecified whether long term insulin use (HCC)  -     EKG 12 Lead    Hyperlipidemia LDL goal <70  -     EKG 12 Lead    Essential hypertension  -     spironolactone (ALDACTONE) 25 MG tablet; Take 1 tablet by mouth daily    Hypercholesteremia  -     REPATHA SURECLICK 617 MG/ML SOAJ; Inject 140 mg into the skin every 14 days    Return in about 6 months (around 6/2/2023).        Beltran Torres MD  12/2/2022  1:16 PM

## 2022-12-05 ENCOUNTER — PATIENT MESSAGE (OUTPATIENT)
Dept: ENDOCRINOLOGY | Age: 62
End: 2022-12-05

## 2022-12-05 RX ORDER — SEMAGLUTIDE 1.34 MG/ML
INJECTION, SOLUTION SUBCUTANEOUS
Qty: 4.5 ML | Refills: 3 | Status: SHIPPED | OUTPATIENT
Start: 2022-12-05

## 2022-12-05 NOTE — TELEPHONE ENCOUNTER
From: Cesar Danielle  To: Dr. Elina Bacon: 12/5/2022 10:21 AM EST  Subject: Ozempic 1 mg Rx    We had discussed increasing my present dose of 0.5 mg to 1 mg on Ozempic; however, [facility] has contacted me and stated that the 1 mg is on back order with the  [facility] given them an estimated availability date of mid-January 2023. I currently have three more doses/weeks on hand. The representative Denver Rack) stated that the 0.5 dosage is available and can be filled immediately. In view of this, if you are agreeable, will you please submit a new prescription for my present 0.5 mg dosage? As always, thank you in advance.

## 2022-12-15 ENCOUNTER — TELEPHONE (OUTPATIENT)
Dept: PRIMARY CARE CLINIC | Facility: CLINIC | Age: 62
End: 2022-12-15

## 2023-03-09 ENCOUNTER — HOSPITAL ENCOUNTER (OUTPATIENT)
Dept: CT IMAGING | Age: 63
Discharge: HOME | End: 2023-03-09
Payer: COMMERCIAL

## 2023-03-09 VITALS — HEIGHT: 61 IN | BODY MASS INDEX: 48.33 KG/M2 | WEIGHT: 256 LBS

## 2023-03-09 DIAGNOSIS — Z87.891 PERSONAL HISTORY OF TOBACCO USE: ICD-10-CM

## 2023-03-09 PROCEDURE — 71271 CT THORAX LUNG CANCER SCR C-: CPT

## 2023-03-09 NOTE — RESULT ENCOUNTER NOTE
Please let patient know that there were no worrisome findings on her CT scan for lung cancer. She will need a follow-up scan in 1 year which I will arrange at next visit.

## 2023-04-03 NOTE — PROGRESS NOTES
Progress note in the chart. Impression  See Progress note in the chart. CT Chest:   CT LUNG SCREENING 03/09/2023    Narrative  CT chest without contrast using Low Dose Screening Technique    Comparison: Chest CT 2/21/2022    Indication: Tobacco dependence, cigarette smoking  Age: 63  Pack-year history: 63  Current smoker: No  Any current signs or symptoms of lung carcinoma: No      Technique:  Transaxial CT imaging from the thoracic inlet through the lung bases  was performed. Low dose protocol was implemented. (LDCT) Slice thickness is  6.07 mm. Radiation dose reduction techniques were used for this study. Our CT  scanners use one or all of the following: Automated exposure control, adjustment  of the mA and/or kV according to patient size, iterative reconstruction. FINDINGS:    Mediastinum and visualized thyroid: Normal.    Heart: Multivessel coronary atherosclerotic calcifications. Large Vessels: Aortic atherosclerotic calcifications without aneurysmal  dilation. Pleura: Normal.    Lungs: No discrete lung nodule. Airways: Normal.    Lymph nodes: Normal.    Bones/Soft tissues: Normal.    Visualized abdomen: Normal.    Impression  No discrete lung nodule. L1 Negative: No nodules or nodules with specific calcifications such as  complete, central, popcorn, concentric rings, and fat containing nodules. Continue annual screening with noncontrast chest CT using LDCT protocol in 12  months. Please note that a negative study does not mean the patient does not have lung  cancer. Nuclear Medicine: No results found for this or any previous visit from the past 3650 days. PFTs:   Office Spirometry Results Latest Ref Rng & Units 10/4/2022   FVC L 2.25   FEV1 L 1.67   FEV1 %PRED-PRE % 71   FVC %PRED-PRE % 75   FEV1/FVC % 74     No results found for this or any previous visit. No results found for this or any previous visit. FeNO: No results found for this or any previous visit.   FeNO and

## 2023-04-04 ENCOUNTER — NURSE ONLY (OUTPATIENT)
Dept: PULMONOLOGY | Age: 63
End: 2023-04-04
Payer: COMMERCIAL

## 2023-04-04 ENCOUNTER — OFFICE VISIT (OUTPATIENT)
Dept: PULMONOLOGY | Age: 63
End: 2023-04-04
Payer: COMMERCIAL

## 2023-04-04 VITALS
SYSTOLIC BLOOD PRESSURE: 121 MMHG | TEMPERATURE: 96.8 F | HEIGHT: 62 IN | WEIGHT: 261 LBS | BODY MASS INDEX: 48.03 KG/M2 | HEART RATE: 78 BPM | OXYGEN SATURATION: 96 % | DIASTOLIC BLOOD PRESSURE: 75 MMHG | RESPIRATION RATE: 18 BRPM

## 2023-04-04 DIAGNOSIS — G47.00 INSOMNIA, UNSPECIFIED TYPE: ICD-10-CM

## 2023-04-04 DIAGNOSIS — G47.34 NOCTURNAL HYPOXEMIA: ICD-10-CM

## 2023-04-04 DIAGNOSIS — J43.9 PULMONARY EMPHYSEMA, UNSPECIFIED EMPHYSEMA TYPE (HCC): Primary | ICD-10-CM

## 2023-04-04 DIAGNOSIS — J44.9 CHRONIC OBSTRUCTIVE PULMONARY DISEASE, UNSPECIFIED COPD TYPE (HCC): Primary | ICD-10-CM

## 2023-04-04 LAB
FEF25-27, POC: 1.34 L/S
FET, POC: NORMAL
FEV 1 , POC: 1.76 L
FEV1/FVC, POC: NORMAL
FVC, POC: NORMAL
LUNG AGE, POC: NORMAL
PEF, POC: 3.98 L/S

## 2023-04-04 PROCEDURE — 99214 OFFICE O/P EST MOD 30 MIN: CPT | Performed by: NURSE PRACTITIONER

## 2023-04-04 PROCEDURE — 94726 PLETHYSMOGRAPHY LUNG VOLUMES: CPT | Performed by: INTERNAL MEDICINE

## 2023-04-04 PROCEDURE — 3074F SYST BP LT 130 MM HG: CPT | Performed by: NURSE PRACTITIONER

## 2023-04-04 PROCEDURE — 94010 BREATHING CAPACITY TEST: CPT | Performed by: INTERNAL MEDICINE

## 2023-04-04 PROCEDURE — 3078F DIAST BP <80 MM HG: CPT | Performed by: NURSE PRACTITIONER

## 2023-04-04 PROCEDURE — 94729 DIFFUSING CAPACITY: CPT | Performed by: INTERNAL MEDICINE

## 2023-06-06 ENCOUNTER — OFFICE VISIT (OUTPATIENT)
Age: 63
End: 2023-06-06
Payer: COMMERCIAL

## 2023-06-06 VITALS
SYSTOLIC BLOOD PRESSURE: 126 MMHG | BODY MASS INDEX: 48.52 KG/M2 | WEIGHT: 257 LBS | DIASTOLIC BLOOD PRESSURE: 82 MMHG | HEIGHT: 61 IN | HEART RATE: 80 BPM

## 2023-06-06 DIAGNOSIS — R80.9 ALBUMINURIA: ICD-10-CM

## 2023-06-06 DIAGNOSIS — I10 HYPERTENSION, ESSENTIAL: ICD-10-CM

## 2023-06-06 DIAGNOSIS — I10 ESSENTIAL HYPERTENSION: ICD-10-CM

## 2023-06-06 DIAGNOSIS — E78.5 HYPERLIPIDEMIA LDL GOAL <70: ICD-10-CM

## 2023-06-06 DIAGNOSIS — I25.10 CORONARY ARTERY DISEASE INVOLVING NATIVE HEART WITHOUT ANGINA PECTORIS, UNSPECIFIED VESSEL OR LESION TYPE: ICD-10-CM

## 2023-06-06 DIAGNOSIS — E11.9 TYPE 2 DIABETES MELLITUS WITHOUT COMPLICATION, UNSPECIFIED WHETHER LONG TERM INSULIN USE (HCC): Primary | ICD-10-CM

## 2023-06-06 DIAGNOSIS — T45.525A: ICD-10-CM

## 2023-06-06 DIAGNOSIS — E78.00 HYPERCHOLESTEREMIA: ICD-10-CM

## 2023-06-06 PROCEDURE — 3079F DIAST BP 80-89 MM HG: CPT | Performed by: INTERNAL MEDICINE

## 2023-06-06 PROCEDURE — 3074F SYST BP LT 130 MM HG: CPT | Performed by: INTERNAL MEDICINE

## 2023-06-06 PROCEDURE — 99214 OFFICE O/P EST MOD 30 MIN: CPT | Performed by: INTERNAL MEDICINE

## 2023-06-06 PROCEDURE — S9470 NUTRITIONAL COUNSELING, DIET: HCPCS | Performed by: INTERNAL MEDICINE

## 2023-06-06 RX ORDER — PRAVASTATIN SODIUM 40 MG
40 TABLET ORAL DAILY
Qty: 90 TABLET | Refills: 3 | Status: SHIPPED | OUTPATIENT
Start: 2023-06-06

## 2023-06-06 RX ORDER — SERTRALINE HYDROCHLORIDE 25 MG/1
TABLET, FILM COATED ORAL
COMMUNITY
Start: 2023-05-01

## 2023-06-06 RX ORDER — OLMESARTAN MEDOXOMIL 5 MG/1
5 TABLET ORAL DAILY
Qty: 90 TABLET | Refills: 3 | Status: SHIPPED | OUTPATIENT
Start: 2023-06-06

## 2023-06-06 ASSESSMENT — ENCOUNTER SYMPTOMS
COUGH: 0
NAIL CHANGES: 0
APHONIA: 0
STRIDOR: 0
ABDOMINAL PAIN: 0
EYE PAIN: 0

## 2023-06-06 NOTE — PROGRESS NOTES
[address] [place] [address] [address], [address] [employer], [address] [place] [address]  PHONE: [phone]    SUBJECTIVE:   Chico Mullins is a 63 y.o. female 1960   seen for a follow up visit regarding the following:     Chief Complaint   Patient presents with    6 Month Follow-Up         History of present illness: 63 y.o. female presented for follow-up 6/6/23 Sx better now on ozempic 1 mg weekly. Cardiac History:   ECG at [facility] 03/2017 - sinus rhythm, poor R-wave progression, negative precordial leads, possible pulmonary disease pattern. Stress perfusion study ordered 03/22/2017. Stress perfusion study 04/2017 - normal perfusion, breast attenuation artifact. 5/2018 Echo normal   5/24/2019 NSTEMI   5/25/2019 PCI to RCA   2/03/2022 Technical qualifiers: Echo study was technically difficult with poor endocardial visualization. Left Ventricle: Left ventricle size is normal. Normal wall thickness. Normal wall motion. Normal left ventricular systolic function with a visually estimated EF of 60 - 65%. No significant valve disease. 2022 Intolerant to Jardeice   12/2/20202 Sinus  Rhythm Low voltage in precordial leads Poor R-wave progression -may be secondary to pulmonary disease   Negative precordial T-waves. Assessment and Plan:    Coronary artery disease  Controlled   Diabetes with hyperglycemia  Now on Ozempic patient has increased dose to 1 mg weekly. Hyperlipidemia  On repatha    HTN  Key CAD CHF Meds            spironolactone (ALDACTONE) 25 MG tablet (Taking)    Sig - Route: Take 1 tablet by mouth daily - Oral    REPATHA SURECLICK 766 MG/ML SOAJ (Taking)    Sig - Route: Inject 140 mg into the skin every 14 days - SubCUTAneous    pravastatin (PRAVACHOL) 40 MG tablet (Taking)    Sig - Route: Take 1 tablet by mouth daily - Oral    Class: NO PRINT    olmesartan (BENICAR) 5 MG tablet (Taking)    Sig - Route:  Take 1 tablet by mouth daily - Oral    Class: NO PRINT           True

## 2023-06-22 ENCOUNTER — TELEPHONE (OUTPATIENT)
Age: 63
End: 2023-06-22

## 2023-06-22 DIAGNOSIS — E11.9 TYPE 2 DIABETES MELLITUS WITHOUT COMPLICATION, UNSPECIFIED WHETHER LONG TERM INSULIN USE (HCC): Primary | ICD-10-CM

## 2023-06-22 NOTE — TELEPHONE ENCOUNTER
Patient was supposed to see a nutritionist and no one has called her. I see where Dr Olu Neal ordered this at her last office visit. Please advise.

## 2023-07-07 ENCOUNTER — CLINICAL DOCUMENTATION (OUTPATIENT)
Age: 63
End: 2023-07-07

## 2023-07-07 NOTE — PROGRESS NOTES
Repatha [employer] [id number]/[id number] auto-injectors    CaseId:[id number];Status:Approved; Review Type:Prior Auth; Coverage Start Date:05/17/2023; Coverage End Date:06/26/2024;

## 2023-07-17 ENCOUNTER — OFFICE VISIT (OUTPATIENT)
Dept: ENDOCRINOLOGY | Age: 63
End: 2023-07-17
Payer: COMMERCIAL

## 2023-07-17 VITALS
WEIGHT: 255.8 LBS | OXYGEN SATURATION: 98 % | BODY MASS INDEX: 48.33 KG/M2 | SYSTOLIC BLOOD PRESSURE: 128 MMHG | DIASTOLIC BLOOD PRESSURE: 76 MMHG | HEART RATE: 88 BPM

## 2023-07-17 DIAGNOSIS — Z80.8 FAMILY HISTORY OF THYROID CANCER: ICD-10-CM

## 2023-07-17 DIAGNOSIS — E04.2 MULTIPLE THYROID NODULES: ICD-10-CM

## 2023-07-17 DIAGNOSIS — E78.00 HYPERCHOLESTEREMIA: ICD-10-CM

## 2023-07-17 DIAGNOSIS — I10 ESSENTIAL HYPERTENSION: ICD-10-CM

## 2023-07-17 DIAGNOSIS — E11.65 TYPE 2 DIABETES MELLITUS WITH HYPERGLYCEMIA, WITHOUT LONG-TERM CURRENT USE OF INSULIN (HCC): ICD-10-CM

## 2023-07-17 DIAGNOSIS — I25.10 CHRONIC CORONARY ARTERY DISEASE: ICD-10-CM

## 2023-07-17 DIAGNOSIS — R80.9 ALBUMINURIA: ICD-10-CM

## 2023-07-17 DIAGNOSIS — E03.9 PRIMARY HYPOTHYROIDISM: Primary | ICD-10-CM

## 2023-07-17 PROCEDURE — 99214 OFFICE O/P EST MOD 30 MIN: CPT | Performed by: INTERNAL MEDICINE

## 2023-07-17 PROCEDURE — 3078F DIAST BP <80 MM HG: CPT | Performed by: INTERNAL MEDICINE

## 2023-07-17 PROCEDURE — 3074F SYST BP LT 130 MM HG: CPT | Performed by: INTERNAL MEDICINE

## 2023-07-17 PROCEDURE — 76536 US EXAM OF HEAD AND NECK: CPT | Performed by: INTERNAL MEDICINE

## 2023-07-17 RX ORDER — LEVOTHYROXINE SODIUM 0.03 MG/1
25 TABLET ORAL
Qty: 90 TABLET | Refills: 3 | Status: SHIPPED | OUTPATIENT
Start: 2023-07-17

## 2023-07-17 RX ORDER — EVOLOCUMAB 140 MG/ML
140 INJECTION, SOLUTION SUBCUTANEOUS
Qty: 6 ML | Refills: 3
Start: 2023-07-17

## 2023-07-17 RX ORDER — OLMESARTAN MEDOXOMIL 5 MG/1
5 TABLET ORAL DAILY
Qty: 90 TABLET | Refills: 3
Start: 2023-07-17

## 2023-07-17 RX ORDER — LANCETS 33 GAUGE
EACH MISCELLANEOUS
Qty: 300 EACH | Refills: 3
Start: 2023-07-17

## 2023-07-17 RX ORDER — BLOOD SUGAR DIAGNOSTIC
STRIP MISCELLANEOUS
Qty: 300 EACH | Refills: 3
Start: 2023-07-17

## 2023-07-17 RX ORDER — PRAVASTATIN SODIUM 40 MG
40 TABLET ORAL DAILY
Qty: 90 TABLET | Refills: 3
Start: 2023-07-17

## 2023-07-17 RX ORDER — SEMAGLUTIDE 2.68 MG/ML
2 INJECTION, SOLUTION SUBCUTANEOUS WEEKLY
Qty: 9 ML | Refills: 3
Start: 2023-07-17

## 2023-07-17 ASSESSMENT — ENCOUNTER SYMPTOMS
VOICE CHANGE: 0
TROUBLE SWALLOWING: 0

## 2023-09-10 DIAGNOSIS — E11.65 TYPE 2 DIABETES MELLITUS WITH HYPERGLYCEMIA, WITHOUT LONG-TERM CURRENT USE OF INSULIN (HCC): ICD-10-CM

## 2023-09-11 RX ORDER — SEMAGLUTIDE 2.68 MG/ML
2 INJECTION, SOLUTION SUBCUTANEOUS WEEKLY
Qty: 9 ML | Refills: 3 | Status: SHIPPED | OUTPATIENT
Start: 2023-09-11

## 2023-10-03 NOTE — PROGRESS NOTES
Name:  Kerry Miller  YOB: 1960   MRN: [id number]      Office Visit: 10/4/2023        ASSESSMENT AND PLAN:  (Medical Decision Making)    Impression: 63 y.o. female with COPD and obesity. Emphysema changes on CT scan. 1. Chronic obstructive pulmonary disease, unspecified COPD type ([address])  --continue Anoro once daily. She reports receiving benefit from this. 2. Pulmonary emphysema, unspecified emphysema type ([address])  --She has already quit smoking. Most recent screening CT of chest was done 3/9/2023 which did not reveal any worrisome findings for lung cancer. She needs repeat screening CT scan in March, 2024.    3. Body mass index (BMI) 45.0-49.9, adult [facility])  --She is congratulated on her weight loss and encouraged to continue with her lifestyle changes. 4. Insomnia, unspecified type  --Resolved with Zoloft. 5.  Personal history of tobacco use  ----Discussed with patient current guidelines for screening for lung cancer. Current recommendations are to obtain yearly screening LDCT yearly from age 49-80, or until smoke free for 15 years. Patient has 63 pack year history of cigarette smoking and currently smoke free since 2019. Discussed with patient risks and benefits of screening, including over-diagnosis, false positive rate, and total radiation exposure. Patient currently exhibits no signs or symptoms suggestive of lung cancer. Discussed with patient importance of compliance with yearly annual lung cancer screenings and willingness to undergo diagnosis and treatment if screening scan is positive. In addition, patient was counseled regarding remaining smoke free.     Orders Placed This Encounter   Medications    umeclidinium-vilanterol (ANORO ELLIPTA) 62.5-25 MCG/ACT inhaler     Sig: Inhale 1 puff into the lungs daily     Dispense:  3 each     Refill:  3    albuterol sulfate HFA (PROVENTIL;VENTOLIN;PROAIR) 108 (90 Base) MCG/ACT inhaler     Sig: Inhale 2 puffs into the lungs

## 2023-10-04 ENCOUNTER — OFFICE VISIT (OUTPATIENT)
Dept: PULMONOLOGY | Age: 63
End: 2023-10-04
Payer: COMMERCIAL

## 2023-10-04 VITALS
HEART RATE: 83 BPM | SYSTOLIC BLOOD PRESSURE: 122 MMHG | RESPIRATION RATE: 18 BRPM | OXYGEN SATURATION: 98 % | HEIGHT: 61 IN | BODY MASS INDEX: 47.46 KG/M2 | DIASTOLIC BLOOD PRESSURE: 74 MMHG | TEMPERATURE: 97.4 F | WEIGHT: 251.4 LBS

## 2023-10-04 DIAGNOSIS — G47.00 INSOMNIA, UNSPECIFIED TYPE: ICD-10-CM

## 2023-10-04 DIAGNOSIS — J43.9 PULMONARY EMPHYSEMA, UNSPECIFIED EMPHYSEMA TYPE (HCC): ICD-10-CM

## 2023-10-04 DIAGNOSIS — J44.9 CHRONIC OBSTRUCTIVE PULMONARY DISEASE, UNSPECIFIED COPD TYPE (HCC): Primary | ICD-10-CM

## 2023-10-04 DIAGNOSIS — Z87.891 PERSONAL HISTORY OF TOBACCO USE: ICD-10-CM

## 2023-10-04 PROCEDURE — 99214 OFFICE O/P EST MOD 30 MIN: CPT | Performed by: NURSE PRACTITIONER

## 2023-10-04 PROCEDURE — G0296 VISIT TO DETERM LDCT ELIG: HCPCS | Performed by: NURSE PRACTITIONER

## 2023-10-04 PROCEDURE — 3074F SYST BP LT 130 MM HG: CPT | Performed by: NURSE PRACTITIONER

## 2023-10-04 PROCEDURE — 3078F DIAST BP <80 MM HG: CPT | Performed by: NURSE PRACTITIONER

## 2023-10-04 RX ORDER — ALBUTEROL SULFATE 90 UG/1
2 AEROSOL, METERED RESPIRATORY (INHALATION) EVERY 4 HOURS PRN
Qty: 3 EACH | Refills: 3 | Status: SHIPPED | OUTPATIENT
Start: 2023-10-04

## 2023-10-04 RX ORDER — UMECLIDINIUM BROMIDE AND VILANTEROL TRIFENATATE 62.5; 25 UG/1; UG/1
1 POWDER RESPIRATORY (INHALATION) DAILY
Qty: 3 EACH | Refills: 3 | Status: SHIPPED | OUTPATIENT
Start: 2023-10-04

## 2023-12-04 DIAGNOSIS — E78.00 HYPERCHOLESTEREMIA: ICD-10-CM

## 2023-12-04 RX ORDER — EVOLOCUMAB 140 MG/ML
140 INJECTION, SOLUTION SUBCUTANEOUS
Qty: 6 ML | Refills: 5 | Status: SHIPPED | OUTPATIENT
Start: 2023-12-04 | End: 2023-12-06 | Stop reason: SDUPTHER

## 2023-12-06 ENCOUNTER — OFFICE VISIT (OUTPATIENT)
Age: 63
End: 2023-12-06
Payer: COMMERCIAL

## 2023-12-06 VITALS
BODY MASS INDEX: 45.88 KG/M2 | WEIGHT: 243 LBS | SYSTOLIC BLOOD PRESSURE: 132 MMHG | DIASTOLIC BLOOD PRESSURE: 76 MMHG | HEIGHT: 61 IN

## 2023-12-06 DIAGNOSIS — I10 ESSENTIAL HYPERTENSION: ICD-10-CM

## 2023-12-06 DIAGNOSIS — E11.9 TYPE 2 DIABETES MELLITUS WITHOUT COMPLICATION, UNSPECIFIED WHETHER LONG TERM INSULIN USE (HCC): ICD-10-CM

## 2023-12-06 DIAGNOSIS — E78.00 HYPERCHOLESTEREMIA: ICD-10-CM

## 2023-12-06 DIAGNOSIS — I25.10 CORONARY ARTERY DISEASE INVOLVING NATIVE HEART WITHOUT ANGINA PECTORIS, UNSPECIFIED VESSEL OR LESION TYPE: Primary | ICD-10-CM

## 2023-12-06 PROCEDURE — 3075F SYST BP GE 130 - 139MM HG: CPT | Performed by: INTERNAL MEDICINE

## 2023-12-06 PROCEDURE — 99214 OFFICE O/P EST MOD 30 MIN: CPT | Performed by: INTERNAL MEDICINE

## 2023-12-06 PROCEDURE — 93000 ELECTROCARDIOGRAM COMPLETE: CPT | Performed by: INTERNAL MEDICINE

## 2023-12-06 PROCEDURE — 3078F DIAST BP <80 MM HG: CPT | Performed by: INTERNAL MEDICINE

## 2023-12-06 RX ORDER — PERPHENAZINE 16 MG
1 TABLET ORAL DAILY
COMMUNITY

## 2023-12-06 RX ORDER — FAMOTIDINE 20 MG/1
TABLET, FILM COATED ORAL 2 TIMES DAILY PRN
COMMUNITY
Start: 2023-09-25

## 2023-12-06 RX ORDER — SPIRONOLACTONE 25 MG/1
25 TABLET ORAL DAILY
Qty: 90 TABLET | Refills: 3 | Status: SHIPPED | OUTPATIENT
Start: 2023-12-06

## 2023-12-06 RX ORDER — EVOLOCUMAB 140 MG/ML
140 INJECTION, SOLUTION SUBCUTANEOUS
Qty: 6 ML | Refills: 5 | Status: SHIPPED | OUTPATIENT
Start: 2024-04-01

## 2023-12-06 RX ORDER — NITROGLYCERIN 0.4 MG/1
0.4 TABLET SUBLINGUAL EVERY 5 MIN PRN
Qty: 25 TABLET | Refills: 3 | Status: SHIPPED | OUTPATIENT
Start: 2023-12-06

## 2023-12-06 ASSESSMENT — ENCOUNTER SYMPTOMS
NAIL CHANGES: 0
COUGH: 0
APHONIA: 0
ABDOMINAL PAIN: 0
EYE PAIN: 0
STRIDOR: 0

## 2023-12-06 NOTE — PROGRESS NOTES
visits on 12/06/23. Ash Lyon was seen today for 6 month follow-up and hypertension. Diagnoses and all orders for this visit:    Coronary artery disease involving native heart without angina pectoris, unspecified vessel or lesion type    Type 2 diabetes mellitus without complication, unspecified whether long term insulin use (HCC)    Hypercholesteremia    Essential hypertension  -     EKG 12 Lead - Clinic Performed  -     spironolactone (ALDACTONE) 25 MG tablet; Take 1 tablet by mouth daily    Other orders  -     nitroGLYCERIN (NITROSTAT) 0.4 MG SL tablet; Place 1 tablet under the tongue every 5 minutes as needed for Chest pain up to max of 3 total doses. If no relief after 1 dose, call 911. Return in about 6 months (around 6/6/2024).        Mera Szymanski MD  12/6/2023  10:16 AM

## 2024-03-19 ENCOUNTER — OFFICE VISIT (OUTPATIENT)
Dept: ORTHOPEDIC SURGERY | Age: 64
End: 2024-03-19
Payer: COMMERCIAL

## 2024-03-19 DIAGNOSIS — M79.641 PAIN IN BOTH HANDS: Primary | ICD-10-CM

## 2024-03-19 DIAGNOSIS — M18.0 ARTHRITIS OF CARPOMETACARPAL (CMC) JOINT OF BOTH THUMBS: ICD-10-CM

## 2024-03-19 DIAGNOSIS — M79.642 PAIN IN BOTH HANDS: Primary | ICD-10-CM

## 2024-03-19 PROCEDURE — 99214 OFFICE O/P EST MOD 30 MIN: CPT | Performed by: NURSE PRACTITIONER

## 2024-03-19 RX ORDER — MELOXICAM 15 MG/1
15 TABLET ORAL DAILY
Qty: 28 TABLET | Refills: 0 | Status: SHIPPED | OUTPATIENT
Start: 2024-03-19 | End: 2024-04-16

## 2024-03-19 NOTE — PROGRESS NOTES
Patient denied left CMC Spint.    Patient was fit for a CMC splint for patients right hand/joint. I demonstrated that the thumb slides into the opening and Velcro on the dorsal side of the hand. The strap continues around the thumb and Velcro's again on the ventral side of hand or palm, and then continues through the thumb and first finger to Velcro again on the dorsal side of hand. Patient read and signed documenting they understand and agree to [facility]'s current DME return policy.   
demonstrates mild joint space narrowing, subluxation and osteophytic changes of the trapezium consistent with osteoarthritis of the thumb CMC joint.      Impression: Thumb CMC joint osteoarthritis as noted above     CLEM Keane CNP      Assessment:     ICD-10-CM    1. Pain in both hands  M79.641 XR HAND BILATERAL MINIMUM 3 VW    M79.642       2. Arthritis of carpometacarpal (CMC) joint of both thumbs  M18.0 [place] [facility] [place]          Plan:  We discussed the diagnosis and different treatment options. We discussed observation, day/night splinting, cortisone injection(s) and surgical reconstruction with trapeziectomy and suspension arthroplasty and the risks and benefits of all were clearly outlined.  We discussed the fact that the vast majority of thumb CMC arthritis causes persistent chronic pain and that surgical reconstruction is the endpoint for patients whose symptoms are not properly alleviated with conservative measures but the vast majority of patients end up having surgery. After discussing all options in detail the patient elects to proceed with bilateral thumb CMC braces.  We will add Mobic and Voltaren.  We did discuss injections.  She would like to hold off on these.  We also discussed surgical intervention if she fails conservative treatment.  I will see her back as needed..     Patient voiced accordance and understanding of the information provided and the formulated plan. All questions were answered to the patient's satisfaction during the encounter.    4 This is a chronic illness with exacerbation, progression, or side effect of treatment  Treatment at this time: Prescription medication and Brace    CLEM Keane CNP  Orthopaedic Surgery  03/19/24  10:26 AM

## 2024-04-04 ENCOUNTER — HOSPITAL ENCOUNTER (OUTPATIENT)
Dept: CT IMAGING | Age: 64
Discharge: HOME | End: 2024-04-04
Payer: COMMERCIAL

## 2024-04-04 VITALS — WEIGHT: 243 LBS | HEIGHT: 61 IN | BODY MASS INDEX: 45.88 KG/M2

## 2024-04-04 DIAGNOSIS — Z87.891 PERSONAL HISTORY OF TOBACCO USE: ICD-10-CM

## 2024-04-04 DIAGNOSIS — M18.0 ARTHRITIS OF CARPOMETACARPAL (CMC) JOINT OF BOTH THUMBS: Primary | ICD-10-CM

## 2024-04-04 PROCEDURE — 71271 CT THORAX LUNG CANCER SCR C-: CPT

## 2024-04-04 RX ORDER — MELOXICAM 15 MG/1
15 TABLET ORAL DAILY
Qty: 28 TABLET | Refills: 0 | Status: SHIPPED | OUTPATIENT
Start: 2024-04-04 | End: 2024-05-02

## 2024-04-09 ENCOUNTER — TELEPHONE (OUTPATIENT)
Dept: PULMONOLOGY | Age: 64
End: 2024-04-09

## 2024-04-24 NOTE — PROGRESS NOTES
Name:  Bethany Danielle  YOB: 1960   MRN: [id number]      Office Visit: 4/25/2024         Bethany Danielle, was evaluated through a synchronous (real-time) audio-video encounter. The patient (or guardian if applicable) is aware that this is a billable service, which includes applicable co-pays. This Virtual Visit was conducted with patient's (and/or legal guardian's) consent. The visit was conducted pursuant to the emergency declaration under the [place] Act and the National Emergencies Act, 1135 waiver authority and the Coronavirus Preparedness and Response Supplemental Appropriations Act. Patient identification was verified, and a caregiver was present when appropriate. The patient was located in a state where the provider was licensed to provide care.     Services were provided through a video synchronous discussion virtually to substitute for in-person clinic visit.        Bethany Danielle, was evaluated through a synchronous (real-time) audio-video encounter. The patient (or guardian if applicable) is aware that this is a billable service, which includes applicable co-pays. This Virtual Visit was conducted with patient's (and/or legal guardian's) consent. Patient identification was verified, and a caregiver was present when appropriate.   The patient was located at Home: [address] [place] [id number]  Provider was located at Facility (Appt Dept): [address] [place] [phone]  Confirm you are appropriately licensed, registered, or certified to deliver care in the state where the patient is located as indicated above. If you are not or unsure, please re-schedule the visit: Yes, I confirm.        Total time spent for this encounter: Not billed by time    --CLEM Chery - CNP on 4/25/2024 at 4:07 PM    An electronic signature was used to authenticate this note.       ASSESSMENT AND PLAN:  (Medical Decision Making)    Impression:     1. COPD, mild

## 2024-04-25 ENCOUNTER — TELEMEDICINE (OUTPATIENT)
Age: 64
End: 2024-04-25
Payer: COMMERCIAL

## 2024-04-25 DIAGNOSIS — Z87.891 PERSONAL HISTORY OF TOBACCO USE: ICD-10-CM

## 2024-04-25 DIAGNOSIS — Z71.85 IMMUNIZATION COUNSELING: ICD-10-CM

## 2024-04-25 DIAGNOSIS — E66.01 MORBID (SEVERE) OBESITY DUE TO EXCESS CALORIES (HCC): ICD-10-CM

## 2024-04-25 DIAGNOSIS — J44.9 COPD, MILD (HCC): Primary | ICD-10-CM

## 2024-04-25 DIAGNOSIS — J43.9 PULMONARY EMPHYSEMA, UNSPECIFIED EMPHYSEMA TYPE (HCC): ICD-10-CM

## 2024-04-25 DIAGNOSIS — J98.4 RESTRICTIVE LUNG DISEASE: ICD-10-CM

## 2024-04-25 PROCEDURE — G0296 VISIT TO DETERM LDCT ELIG: HCPCS | Performed by: NURSE PRACTITIONER

## 2024-04-25 PROCEDURE — 99214 OFFICE O/P EST MOD 30 MIN: CPT | Performed by: NURSE PRACTITIONER

## 2024-05-15 ENCOUNTER — OFFICE VISIT (OUTPATIENT)
Dept: ENDOCRINOLOGY | Age: 64
End: 2024-05-15
Payer: COMMERCIAL

## 2024-05-15 VITALS
WEIGHT: 257 LBS | OXYGEN SATURATION: 99 % | DIASTOLIC BLOOD PRESSURE: 81 MMHG | SYSTOLIC BLOOD PRESSURE: 119 MMHG | HEART RATE: 78 BPM | BODY MASS INDEX: 48.56 KG/M2

## 2024-05-15 DIAGNOSIS — E04.2 MULTIPLE THYROID NODULES: ICD-10-CM

## 2024-05-15 DIAGNOSIS — R80.9 ALBUMINURIA: ICD-10-CM

## 2024-05-15 DIAGNOSIS — Z80.8 FAMILY HISTORY OF THYROID CANCER: ICD-10-CM

## 2024-05-15 DIAGNOSIS — E11.65 TYPE 2 DIABETES MELLITUS WITH HYPERGLYCEMIA, WITHOUT LONG-TERM CURRENT USE OF INSULIN (HCC): ICD-10-CM

## 2024-05-15 DIAGNOSIS — I25.10 CHRONIC CORONARY ARTERY DISEASE: ICD-10-CM

## 2024-05-15 DIAGNOSIS — E03.9 PRIMARY HYPOTHYROIDISM: Primary | ICD-10-CM

## 2024-05-15 DIAGNOSIS — I10 ESSENTIAL HYPERTENSION: ICD-10-CM

## 2024-05-15 DIAGNOSIS — E78.00 HYPERCHOLESTEREMIA: ICD-10-CM

## 2024-05-15 PROCEDURE — 3079F DIAST BP 80-89 MM HG: CPT | Performed by: INTERNAL MEDICINE

## 2024-05-15 PROCEDURE — 3074F SYST BP LT 130 MM HG: CPT | Performed by: INTERNAL MEDICINE

## 2024-05-15 PROCEDURE — 99214 OFFICE O/P EST MOD 30 MIN: CPT | Performed by: INTERNAL MEDICINE

## 2024-05-15 RX ORDER — LANCETS 33 GAUGE
EACH MISCELLANEOUS
Qty: 300 EACH | Refills: 3
Start: 2024-05-15

## 2024-05-15 RX ORDER — EVOLOCUMAB 140 MG/ML
140 INJECTION, SOLUTION SUBCUTANEOUS
Qty: 6 ML | Refills: 5
Start: 2024-05-15

## 2024-05-15 RX ORDER — PRAVASTATIN SODIUM 40 MG
40 TABLET ORAL DAILY
Qty: 90 TABLET | Refills: 3
Start: 2024-05-15

## 2024-05-15 RX ORDER — SEMAGLUTIDE 2.68 MG/ML
2 INJECTION, SOLUTION SUBCUTANEOUS WEEKLY
Qty: 9 ML | Refills: 3
Start: 2024-05-15

## 2024-05-15 RX ORDER — OLMESARTAN MEDOXOMIL 5 MG/1
5 TABLET ORAL DAILY
Qty: 90 TABLET | Refills: 3
Start: 2024-05-15

## 2024-05-15 RX ORDER — OMEPRAZOLE 40 MG/1
40 CAPSULE, DELAYED RELEASE ORAL DAILY
COMMUNITY

## 2024-05-15 RX ORDER — LEVOTHYROXINE SODIUM 0.03 MG/1
25 TABLET ORAL
Qty: 90 TABLET | Refills: 3 | Status: SHIPPED | OUTPATIENT
Start: 2024-05-15

## 2024-05-15 RX ORDER — BLOOD SUGAR DIAGNOSTIC
STRIP MISCELLANEOUS
Qty: 300 EACH | Refills: 3
Start: 2024-05-15

## 2024-05-15 ASSESSMENT — ENCOUNTER SYMPTOMS
VOICE CHANGE: 0
TROUBLE SWALLOWING: 1

## 2024-05-15 NOTE — PROGRESS NOTES
YURIDIA Cleary MD, [facility] ENDOCRINOLOGY   AND   THYROID NODULE CLINIC            Reason for visit: Follow-up of hypothyroidism, thyroid nodule, type 2 diabetes mellitus      ASSESSMENT AND PLAN:    1. Primary hypothyroidism  Bethany Danielle is biochemically euthyroid.  Continue thyroid hormone replacement as prescribed.   Residual symptoms are attributable to something other than thyroid dysfunction.   Repeat thyroid function tests prior to the next appointment.  - levothyroxine (SYNTHROID) 25 MCG tablet; Take 1 tablet by mouth every morning (before breakfast)  Dispense: 90 tablet; Refill: 3  - TSH; Future  - T4, Free; Future  - US,HEAD/NECK TISSUES,REAL TIME    2. Multiple thyroid nodules  Ultrasound was repeated most recently in July 2023 and was unchanged.  Because of her family history of thyroid cancer, periodic ultrasound is warranted.    3. Type 2 diabetes mellitus with hyperglycemia, without long-term current use of insulin (HCC)  Glycemic control is quite good on Ozempic 2 mg weekly.  She does not require specialty care for diabetes.  Defer ongoing evaluation management to Dr. Conner Jr.  - OZEMPIC, 2 MG/DOSE, 8 MG/3ML SOPN; Inject 2 mg into the skin once a week  Dispense: 9 mL; Refill: 3  - ONETOUCH VERIO strip; Check blood glucose 3 times daily.  Dx E11.9  Dispense: 300 each; Refill: 3  - OneTouch Delica Lancets 33G MISC; Check blood glucose 3 times daily.  Dx E11.9  Dispense: 300 each; Refill: 3  - Comprehensive Metabolic Panel; Future  - Hemoglobin A1C; Future    4. Chronic coronary artery disease    5. Essential hypertension  BP: 119/81   - olmesartan (BENICAR) 5 MG tablet; Take 1 tablet by mouth daily  Dispense: 90 tablet; Refill: 3    6. Hypercholesteremia  - pravastatin (PRAVACHOL) 40 MG tablet; Take 1 tablet by mouth daily  Dispense: 90 tablet; Refill: 3  - REPATHA SURECLICK 140 MG/ML SOAJ; Inject 140 mg into the skin every 14 days  Dispense: 6 mL; Refill: 3  - Lipid Panel;

## 2024-06-04 ENCOUNTER — TELEPHONE (OUTPATIENT)
Dept: ENDOCRINOLOGY | Age: 64
End: 2024-06-04

## 2024-06-04 NOTE — TELEPHONE ENCOUNTER
This request has been approved using information available on the patient's profile. CaseId:[id number];Status:Approved;Review Type:Prior Auth;Coverage Start Date:05/05/2024;Coverage End Date:06/04/2025;

## 2024-06-07 ENCOUNTER — OFFICE VISIT (OUTPATIENT)
Age: 64
End: 2024-06-07
Payer: COMMERCIAL

## 2024-06-07 VITALS
SYSTOLIC BLOOD PRESSURE: 126 MMHG | HEIGHT: 61 IN | BODY MASS INDEX: 48.33 KG/M2 | DIASTOLIC BLOOD PRESSURE: 78 MMHG | WEIGHT: 256 LBS | HEART RATE: 80 BPM

## 2024-06-07 DIAGNOSIS — E11.9 TYPE 2 DIABETES MELLITUS WITHOUT COMPLICATION, UNSPECIFIED WHETHER LONG TERM INSULIN USE (HCC): ICD-10-CM

## 2024-06-07 DIAGNOSIS — E78.5 HYPERLIPIDEMIA LDL GOAL <70: ICD-10-CM

## 2024-06-07 DIAGNOSIS — I10 ESSENTIAL HYPERTENSION: ICD-10-CM

## 2024-06-07 DIAGNOSIS — E78.00 HYPERCHOLESTEREMIA: Primary | ICD-10-CM

## 2024-06-07 DIAGNOSIS — R80.9 ALBUMINURIA: ICD-10-CM

## 2024-06-07 PROCEDURE — 3074F SYST BP LT 130 MM HG: CPT | Performed by: INTERNAL MEDICINE

## 2024-06-07 PROCEDURE — 3078F DIAST BP <80 MM HG: CPT | Performed by: INTERNAL MEDICINE

## 2024-06-07 PROCEDURE — 99214 OFFICE O/P EST MOD 30 MIN: CPT | Performed by: INTERNAL MEDICINE

## 2024-06-07 RX ORDER — PRAVASTATIN SODIUM 40 MG
40 TABLET ORAL DAILY
Qty: 90 TABLET | Refills: 3 | Status: SHIPPED | OUTPATIENT
Start: 2024-06-07

## 2024-06-07 RX ORDER — OLMESARTAN MEDOXOMIL 5 MG/1
5 TABLET ORAL DAILY
Qty: 90 TABLET | Refills: 3 | Status: SHIPPED | OUTPATIENT
Start: 2024-06-07

## 2024-06-07 RX ORDER — MELOXICAM 7.5 MG/1
7.5 TABLET ORAL DAILY
COMMUNITY

## 2024-06-07 RX ORDER — LORAZEPAM 1 MG/1
1 TABLET ORAL 3 TIMES DAILY PRN
COMMUNITY
Start: 2023-08-10

## 2024-06-07 ASSESSMENT — ENCOUNTER SYMPTOMS
ABDOMINAL PAIN: 0
EYE PAIN: 0
NAIL CHANGES: 0
STRIDOR: 0
APHONIA: 0
COUGH: 0

## 2024-06-07 NOTE — PROGRESS NOTES
[facility], [address], [address]  [place] [address]  PHONE: [phone]    SUBJECTIVE:   Bethany Danielle is a 63 y.o. female 1960   seen for a follow up visit regarding the following:     Chief Complaint   Patient presents with    Follow-up     6 MONTHS    Hypertension    Hyperlipidemia         History of present illness: 63 y.o. female presented for follow-up 6/7/24 Sx better now on ozempic 2 mg weekly.  She has not lost weight her A1c is controlled. Increased stressors mother is has end stage cancer, brother with thyroid cancer,  with afib. She is caring for her family members. Rare use of NTG.     Cardiac History:   ECG at [facility] 03/2017 - sinus rhythm, poor R-wave progression, negative precordial leads, possible pulmonary disease pattern.    Stress perfusion study ordered 03/22/2017.   Stress perfusion study 04/2017 - normal perfusion, breast attenuation artifact.   5/2018 Echo normal   5/24/2019 NSTEMI   5/25/2019 PCI to RCA   2/03/2022 Technical qualifiers: Echo study was technically difficult with poor endocardial visualization.    Left Ventricle: Left ventricle size is normal. Normal wall thickness. Normal wall motion. Normal left ventricular systolic function with a visually estimated EF of 60 - 65%.    No significant valve disease.  2022 Intolerant to Jardeice   12/2/20202 Sinus  Rhythm Low voltage in precordial leads Poor R-wave progression -may be secondary to pulmonary disease   Negative precordial T-waves.   12/6/2023 EKG Sinus  Rhythm -Poor R-wave progression Negative precordial T-waves.        Assessment and Plan:    Coronary artery disease  Controlled   We discussed cardiovascular benefits of semaglutide.   Diabetes with hyperglycemia  Now on Ozempic patient has increased dose to 2 mg weekly.   Last A1c 5.2   HPL  On repatha    HTN  Cardiac Medications       Nitrates       nitroGLYCERIN (NITROSTAT) 0.4 MG SL tablet Place 1 tablet under the

## 2024-08-20 DIAGNOSIS — E11.65 TYPE 2 DIABETES MELLITUS WITH HYPERGLYCEMIA, WITHOUT LONG-TERM CURRENT USE OF INSULIN (HCC): ICD-10-CM

## 2024-08-20 RX ORDER — SEMAGLUTIDE 2.68 MG/ML
2 INJECTION, SOLUTION SUBCUTANEOUS WEEKLY
Qty: 9 ML | Refills: 3 | Status: SHIPPED | OUTPATIENT
Start: 2024-08-20

## 2024-09-25 RX ORDER — UMECLIDINIUM BROMIDE AND VILANTEROL TRIFENATATE 62.5; 25 UG/1; UG/1
1 POWDER RESPIRATORY (INHALATION) DAILY
Qty: 3 EACH | Refills: 3 | Status: SHIPPED | OUTPATIENT
Start: 2024-09-25

## 2024-11-13 DIAGNOSIS — E03.9 PRIMARY HYPOTHYROIDISM: ICD-10-CM

## 2024-11-13 LAB
T4 FREE SERPL-MCNC: 1 NG/DL (ref 0.9–1.7)
TSH, 3RD GENERATION: 3.33 UIU/ML (ref 0.27–4.2)

## 2024-11-18 ENCOUNTER — OFFICE VISIT (OUTPATIENT)
Dept: ENDOCRINOLOGY | Age: 64
End: 2024-11-18
Payer: COMMERCIAL

## 2024-11-18 VITALS — SYSTOLIC BLOOD PRESSURE: 120 MMHG | BODY MASS INDEX: 48.94 KG/M2 | DIASTOLIC BLOOD PRESSURE: 75 MMHG | WEIGHT: 259 LBS

## 2024-11-18 DIAGNOSIS — R80.9 ALBUMINURIA: ICD-10-CM

## 2024-11-18 DIAGNOSIS — I10 ESSENTIAL HYPERTENSION: ICD-10-CM

## 2024-11-18 DIAGNOSIS — E11.65 TYPE 2 DIABETES MELLITUS WITH HYPERGLYCEMIA, WITHOUT LONG-TERM CURRENT USE OF INSULIN (HCC): ICD-10-CM

## 2024-11-18 DIAGNOSIS — E78.00 HYPERCHOLESTEREMIA: ICD-10-CM

## 2024-11-18 DIAGNOSIS — E03.9 PRIMARY HYPOTHYROIDISM: ICD-10-CM

## 2024-11-18 PROCEDURE — 99214 OFFICE O/P EST MOD 30 MIN: CPT | Performed by: INTERNAL MEDICINE

## 2024-11-18 PROCEDURE — 3078F DIAST BP <80 MM HG: CPT | Performed by: INTERNAL MEDICINE

## 2024-11-18 PROCEDURE — 3074F SYST BP LT 130 MM HG: CPT | Performed by: INTERNAL MEDICINE

## 2024-11-18 RX ORDER — LEVOTHYROXINE SODIUM 25 UG/1
25 TABLET ORAL
Qty: 90 TABLET | Refills: 3 | Status: SHIPPED | OUTPATIENT
Start: 2024-11-18

## 2024-11-18 RX ORDER — EVOLOCUMAB 140 MG/ML
140 INJECTION, SOLUTION SUBCUTANEOUS
Qty: 6 ML | Refills: 5 | Status: SHIPPED
Start: 2024-11-18

## 2024-11-18 RX ORDER — SEMAGLUTIDE 2.68 MG/ML
2 INJECTION, SOLUTION SUBCUTANEOUS WEEKLY
Qty: 9 ML | Refills: 3 | Status: SHIPPED | OUTPATIENT
Start: 2024-11-18

## 2024-11-18 RX ORDER — BLOOD SUGAR DIAGNOSTIC
STRIP MISCELLANEOUS
Qty: 300 EACH | Refills: 3 | Status: SHIPPED
Start: 2024-11-18

## 2024-11-18 RX ORDER — PRAVASTATIN SODIUM 40 MG
40 TABLET ORAL DAILY
Qty: 90 TABLET | Refills: 3 | Status: SHIPPED | OUTPATIENT
Start: 2024-11-18

## 2024-11-18 RX ORDER — LANCETS 33 GAUGE
EACH MISCELLANEOUS
Qty: 300 EACH | Refills: 3 | Status: SHIPPED
Start: 2024-11-18

## 2024-11-18 RX ORDER — OLMESARTAN MEDOXOMIL 5 MG/1
5 TABLET ORAL DAILY
Qty: 90 TABLET | Refills: 3 | Status: SHIPPED | OUTPATIENT
Start: 2024-11-18

## 2024-11-18 ASSESSMENT — ENCOUNTER SYMPTOMS
TROUBLE SWALLOWING: 1
VOICE CHANGE: 0

## 2024-11-18 NOTE — PROGRESS NOTES
YURIDIA Cleary MD, [facility] ENDOCRINOLOGY   AND   THYROID NODULE CLINIC            Reason for visit: Follow-up of hypothyroidism, thyroid nodule, type 2 diabetes mellitus      ASSESSMENT AND PLAN:    1. Primary hypothyroidism  Bethany Danielle is biochemically euthyroid.  Continue thyroid hormone replacement as prescribed.   Residual symptoms are attributable to something other than thyroid dysfunction.   Repeat thyroid function tests prior to the next appointment.  - levothyroxine (SYNTHROID) 25 MCG tablet; Take 1 tablet by mouth every morning (before breakfast)  Dispense: 90 tablet; Refill: 3  - TSH; Future  - T4, Free; Future    2. Type 2 diabetes mellitus with hyperglycemia, without long-term current use of insulin ([facility])  Glycemic control is excellent.  No changes.  he does not require specialty care for diabetes.  Defer ongoing evaluation management to Dr. Esposito.  - OZEMPIC, 2 MG/DOSE, 8 MG/3ML SOPN sc injection; Inject 2 mg into the skin once a week  Dispense: 9 mL; Refill: 3  - ONETOUCH VERIO strip; Check blood glucose 3 times daily.  Dx E11.9  Dispense: 300 each; Refill: 3  - OneTouch Delica Lancets 33G MISC; Check blood glucose 3 times daily.  Dx E11.9  Dispense: 300 each; Refill: 3  - Comprehensive Metabolic Panel; Future  - Hemoglobin A1C; Future    3. Essential hypertension  BP: 120/75   - olmesartan (BENICAR) 5 MG tablet; Take 1 tablet by mouth daily  Dispense: 90 tablet; Refill: 3    4. Albuminuria  Continue angiotensin receptor blocker therapy.  - olmesartan (BENICAR) 5 MG tablet; Take 1 tablet by mouth daily  Dispense: 90 tablet; Refill: 3    5. Hypercholesteremia  Lab Results   Component Value Date    LDL 27 11/22/2022     - REPATHA SURECLICK 140 MG/ML SOAJ; Inject 140 mg into the skin every 14 days  Dispense: 6 mL; Refill: 5  - pravastatin (PRAVACHOL) 40 MG tablet; Take 1 tablet by mouth daily  Dispense: 90 tablet; Refill: 3          Follow-up and Dispositions    Return in about 6

## 2024-12-10 ENCOUNTER — OFFICE VISIT (OUTPATIENT)
Age: 64
End: 2024-12-10
Payer: COMMERCIAL

## 2024-12-10 VITALS
DIASTOLIC BLOOD PRESSURE: 76 MMHG | HEART RATE: 71 BPM | SYSTOLIC BLOOD PRESSURE: 120 MMHG | WEIGHT: 262 LBS | HEIGHT: 61 IN | BODY MASS INDEX: 49.47 KG/M2

## 2024-12-10 DIAGNOSIS — I25.10 ASCVD (ARTERIOSCLEROTIC CARDIOVASCULAR DISEASE): Primary | ICD-10-CM

## 2024-12-10 DIAGNOSIS — E78.00 HYPERCHOLESTEREMIA: ICD-10-CM

## 2024-12-10 DIAGNOSIS — I10 ESSENTIAL HYPERTENSION: ICD-10-CM

## 2024-12-10 DIAGNOSIS — E78.5 HYPERLIPIDEMIA LDL GOAL <70: ICD-10-CM

## 2024-12-10 PROCEDURE — 3074F SYST BP LT 130 MM HG: CPT | Performed by: INTERNAL MEDICINE

## 2024-12-10 PROCEDURE — 99214 OFFICE O/P EST MOD 30 MIN: CPT | Performed by: INTERNAL MEDICINE

## 2024-12-10 PROCEDURE — 93000 ELECTROCARDIOGRAM COMPLETE: CPT | Performed by: INTERNAL MEDICINE

## 2024-12-10 PROCEDURE — 3078F DIAST BP <80 MM HG: CPT | Performed by: INTERNAL MEDICINE

## 2024-12-10 RX ORDER — FAMOTIDINE 40 MG/1
20 TABLET, FILM COATED ORAL NIGHTLY
COMMUNITY
Start: 2024-07-18

## 2024-12-10 RX ORDER — UBIDECARENONE 75 MG
50 CAPSULE ORAL DAILY
COMMUNITY

## 2024-12-10 RX ORDER — EVOLOCUMAB 140 MG/ML
140 INJECTION, SOLUTION SUBCUTANEOUS
Qty: 6 ML | Refills: 5 | Status: SHIPPED | OUTPATIENT
Start: 2024-12-10

## 2024-12-10 NOTE — PROGRESS NOTES
[facility] [place], [address], [address]  [place] [address]  PHONE: [phone]    SUBJECTIVE:   Bethany Danielle is a 63 y.o. female 1960   seen for a follow up visit regarding the following:     Chief Complaint   Patient presents with    Follow-up     6 months    Hypertension    Hyperlipidemia         History of Present Illness  The patient is a 63-year-old female who presents for cardiac diagnosis results. She has known coronary artery disease (CAD).    She reports a general decline in her well-being, characterized by fatigue, depression, and illness. She experiences occasional chest discomfort, which she attributes to stress. She has been managing her stress with Ativan, although she has not required frequent use of this medication. She has not needed to use nitroglycerin recently.    She has been experiencing stomach ulcers. Her Prilosec dosage was recently increased, which has helped alleviate the symptoms.    Her A1c level is 5.1. She acknowledges overeating due to nervousness but maintains a diet rich in vegetables and meat, with minimal red meat consumption. She is on Ozempic 2 mg weekly.    She has been on pravastatin 40 mg and Repatha for her cholesterol management. She recalls that her cholesterol levels were elevated throughout her life, reaching approximately 150 at the time of her heart attack.    She has a long-standing presence of spider angiomas on her chest, with no similar lesions elsewhere on her body.    Supplemental Information  She has been wearing compression socks and notes that one leg consistently appears more swollen than the other.    FAMILY HISTORY  Her mother has terminal cancer. Her son had thyroid cancer.    ALLERGIES  The patient has an allergy to PLAVIX.    MEDICATIONS  Current: Prilosec, Ozempic, pravastatin, Repatha, Ativan, nitroglycerin        Interval history:     Cardiac History:   ECG at [facility] 03/2017 - sinus rhythm, poor

## 2024-12-12 ENCOUNTER — TELEPHONE (OUTPATIENT)
Age: 64
End: 2024-12-12

## 2024-12-12 NOTE — TELEPHONE ENCOUNTER
Arielle GONZALEZ  Close reason: Prior Authorization not required for patient/medication  Payer: [employer] Patient's Payer Case ID: [id number]    [phone]  Note from payer: Drug is covered by current benefit plan. No further PA activity needed

## 2025-04-03 ENCOUNTER — HOSPITAL ENCOUNTER (EMERGENCY)
Age: 65
Discharge: HOME | End: 2025-04-03
Attending: EMERGENCY MEDICINE
Payer: COMMERCIAL

## 2025-04-03 ENCOUNTER — APPOINTMENT (OUTPATIENT)
Dept: GENERAL RADIOLOGY | Age: 65
End: 2025-04-03
Payer: COMMERCIAL

## 2025-04-03 VITALS
BODY MASS INDEX: 49.47 KG/M2 | SYSTOLIC BLOOD PRESSURE: 135 MMHG | WEIGHT: 262 LBS | OXYGEN SATURATION: 96 % | HEART RATE: 73 BPM | TEMPERATURE: 98.2 F | DIASTOLIC BLOOD PRESSURE: 76 MMHG | HEIGHT: 61 IN | RESPIRATION RATE: 11 BRPM

## 2025-04-03 DIAGNOSIS — R07.89 ATYPICAL CHEST PAIN: Primary | ICD-10-CM

## 2025-04-03 DIAGNOSIS — J18.9 PNEUMONIA OF LEFT LOWER LOBE DUE TO INFECTIOUS ORGANISM: ICD-10-CM

## 2025-04-03 LAB
ALBUMIN SERPL-MCNC: 3.6 G/DL (ref 3.2–4.6)
ALBUMIN/GLOB SERPL: 1.1 (ref 1–1.9)
ALP SERPL-CCNC: 84 U/L (ref 35–104)
ALT SERPL-CCNC: 22 U/L (ref 8–45)
ANION GAP SERPL CALC-SCNC: 11 MMOL/L (ref 7–16)
AST SERPL-CCNC: 22 U/L (ref 15–37)
BASOPHILS # BLD: 0.06 K/UL (ref 0–0.2)
BASOPHILS NFR BLD: 0.8 % (ref 0–2)
BILIRUB SERPL-MCNC: <0.2 MG/DL (ref 0–1.2)
BUN SERPL-MCNC: 14 MG/DL (ref 8–23)
CALCIUM SERPL-MCNC: 9.4 MG/DL (ref 8.8–10.2)
CHLORIDE SERPL-SCNC: 107 MMOL/L (ref 98–107)
CO2 SERPL-SCNC: 24 MMOL/L (ref 20–29)
CREAT SERPL-MCNC: 0.95 MG/DL (ref 0.6–1.1)
DIFFERENTIAL METHOD BLD: NORMAL
EKG ATRIAL RATE: 79 BPM
EKG DIAGNOSIS: NORMAL
EKG P AXIS: 79 DEGREES
EKG P-R INTERVAL: 186 MS
EKG Q-T INTERVAL: 374 MS
EKG QRS DURATION: 82 MS
EKG QTC CALCULATION (BAZETT): 428 MS
EKG R AXIS: 65 DEGREES
EKG T AXIS: 62 DEGREES
EKG VENTRICULAR RATE: 79 BPM
EOSINOPHIL # BLD: 0.19 K/UL (ref 0–0.8)
EOSINOPHIL NFR BLD: 2.6 % (ref 0.5–7.8)
ERYTHROCYTE [DISTWIDTH] IN BLOOD BY AUTOMATED COUNT: 13.8 % (ref 11.9–14.6)
GLOBULIN SER CALC-MCNC: 3.3 G/DL (ref 2.3–3.5)
GLUCOSE SERPL-MCNC: 114 MG/DL (ref 70–99)
HCT VFR BLD AUTO: 44.8 % (ref 35.8–46.3)
HGB BLD-MCNC: 14.6 G/DL (ref 11.7–15.4)
IMM GRANULOCYTES # BLD AUTO: 0.02 K/UL (ref 0–0.5)
IMM GRANULOCYTES NFR BLD AUTO: 0.3 % (ref 0–5)
LYMPHOCYTES # BLD: 1.57 K/UL (ref 0.5–4.6)
LYMPHOCYTES NFR BLD: 21.8 % (ref 13–44)
MCH RBC QN AUTO: 29.3 PG (ref 26.1–32.9)
MCHC RBC AUTO-ENTMCNC: 32.6 G/DL (ref 31.4–35)
MCV RBC AUTO: 90 FL (ref 82–102)
MONOCYTES # BLD: 0.65 K/UL (ref 0.1–1.3)
MONOCYTES NFR BLD: 9 % (ref 4–12)
NEUTS SEG # BLD: 4.72 K/UL (ref 1.7–8.2)
NEUTS SEG NFR BLD: 65.5 % (ref 43–78)
NRBC # BLD: 0 K/UL (ref 0–0.2)
PLATELET # BLD AUTO: 211 K/UL (ref 150–450)
PMV BLD AUTO: 9.8 FL (ref 9.4–12.3)
POTASSIUM SERPL-SCNC: 4.5 MMOL/L (ref 3.5–5.1)
PROT SERPL-MCNC: 6.9 G/DL (ref 6.3–8.2)
RBC # BLD AUTO: 4.98 M/UL (ref 4.05–5.2)
SODIUM SERPL-SCNC: 143 MMOL/L (ref 136–145)
TROPONIN T SERPL HS-MCNC: 6.4 NG/L (ref 0–14)
TROPONIN T SERPL HS-MCNC: <6 NG/L (ref 0–14)
WBC # BLD AUTO: 7.2 K/UL (ref 4.3–11.1)

## 2025-04-03 PROCEDURE — 93005 ELECTROCARDIOGRAM TRACING: CPT | Performed by: EMERGENCY MEDICINE

## 2025-04-03 PROCEDURE — 84484 ASSAY OF TROPONIN QUANT: CPT

## 2025-04-03 PROCEDURE — 6370000000 HC RX 637 (ALT 250 FOR IP): Performed by: EMERGENCY MEDICINE

## 2025-04-03 PROCEDURE — 80053 COMPREHEN METABOLIC PANEL: CPT

## 2025-04-03 PROCEDURE — 87040 BLOOD CULTURE FOR BACTERIA: CPT

## 2025-04-03 PROCEDURE — 36415 COLL VENOUS BLD VENIPUNCTURE: CPT

## 2025-04-03 PROCEDURE — 85025 COMPLETE CBC W/AUTO DIFF WBC: CPT

## 2025-04-03 PROCEDURE — 71045 X-RAY EXAM CHEST 1 VIEW: CPT

## 2025-04-03 PROCEDURE — 99285 EMERGENCY DEPT VISIT HI MDM: CPT

## 2025-04-03 PROCEDURE — 93010 ELECTROCARDIOGRAM REPORT: CPT | Performed by: INTERNAL MEDICINE

## 2025-04-03 RX ORDER — NITROGLYCERIN 0.4 MG/1
0.4 TABLET SUBLINGUAL EVERY 5 MIN PRN
Status: DISCONTINUED | OUTPATIENT
Start: 2025-04-03 | End: 2025-04-03 | Stop reason: HOSPADM

## 2025-04-03 RX ORDER — ASPIRIN 81 MG/1
324 TABLET, CHEWABLE ORAL
Status: COMPLETED | OUTPATIENT
Start: 2025-04-03 | End: 2025-04-03

## 2025-04-03 RX ORDER — DOXYCYCLINE 100 MG/1
100 CAPSULE ORAL
Status: COMPLETED | OUTPATIENT
Start: 2025-04-03 | End: 2025-04-03

## 2025-04-03 RX ORDER — DOXYCYCLINE HYCLATE 100 MG
100 TABLET ORAL 2 TIMES DAILY
Qty: 20 TABLET | Refills: 0 | Status: SHIPPED | OUTPATIENT
Start: 2025-04-03 | End: 2025-04-13

## 2025-04-03 RX ORDER — CEFDINIR 300 MG/1
300 CAPSULE ORAL 2 TIMES DAILY
Qty: 20 CAPSULE | Refills: 0 | Status: SHIPPED | OUTPATIENT
Start: 2025-04-03 | End: 2025-04-13

## 2025-04-03 RX ORDER — CEFDINIR 300 MG/1
300 CAPSULE ORAL
Status: COMPLETED | OUTPATIENT
Start: 2025-04-03 | End: 2025-04-03

## 2025-04-03 RX ADMIN — ASPIRIN 324 MG: 81 TABLET, CHEWABLE ORAL at 13:26

## 2025-04-03 RX ADMIN — DOXYCYCLINE HYCLATE 100 MG: 100 CAPSULE ORAL at 16:27

## 2025-04-03 RX ADMIN — CEFDINIR 300 MG: 300 CAPSULE ORAL at 16:27

## 2025-04-03 ASSESSMENT — PAIN DESCRIPTION - DESCRIPTORS: DESCRIPTORS: ACHING

## 2025-04-03 ASSESSMENT — LIFESTYLE VARIABLES
HOW MANY STANDARD DRINKS CONTAINING ALCOHOL DO YOU HAVE ON A TYPICAL DAY: PATIENT DOES NOT DRINK
HOW OFTEN DO YOU HAVE A DRINK CONTAINING ALCOHOL: NEVER

## 2025-04-03 ASSESSMENT — PAIN - FUNCTIONAL ASSESSMENT: PAIN_FUNCTIONAL_ASSESSMENT: 0-10

## 2025-04-03 ASSESSMENT — PAIN DESCRIPTION - LOCATION
LOCATION: CHEST
LOCATION: CHEST

## 2025-04-03 ASSESSMENT — PAIN SCALES - GENERAL
PAINLEVEL_OUTOF10: 0
PAINLEVEL_OUTOF10: 1
PAINLEVEL_OUTOF10: 3

## 2025-04-03 NOTE — ED NOTES
Patient mobility status  with no difficulty.     I have reviewed discharge instructions with the patient.  The patient verbalized understanding.    Patient left ED via Discharge Method: ambulatory to Home with  self .    Opportunity for questions and clarification provided.     Patient given 2 scripts.            Hiram Chris, RN  04/03/25 [id number]

## 2025-04-03 NOTE — ED PROVIDER NOTES
Emergency Department Provider Note       PCP: Jovanni Esposito MD   Age: 64 y.o.   Sex: female     DISPOSITION Discharge - Pending Orders Complete 04/03/2025 03:37:30 PM    ICD-10-CM    1. Atypical chest pain  R07.89 [facility]      2. Pneumonia of left lower lobe due to infectious organism  J18.9           Medical Decision Making     EKG is reassuring and nonischemic no STEMI.  Rule out MI with serial troponins.  Chest x-ray will be obtained as well.  No focal neurologic deficits.  Aspirin and nitroglycerin ordered due to continuing mild pressure  3:45 PM  Patient found to have left lower lobe infiltrate.  On repeat exam I can hear Rales here in her ED changes.  No fever or leukocytosis or tachycardia suggestive of sepsis.  Antibiotics are ordered.  Patient agreeable with follow-up with her cardiologist as an outpatient.  Did not seem to get significant relief with nitroglycerin.  EKG is normal and nonischemic and high-sensitivity troponin x 2 are negative.  Doubt aortic dissection or PE.  No tachycardia or hypoxia no radiation of pain to the back.  Mediastinum normal on EKG.  I think patient is safe and appropriate to be treated for pneumonia on an outpatient basis and follow-up with cardiology.  Return precautions are provided       1 or more acute illnesses that pose a threat to life or bodily function.   Over the counter drug management performed.  Drug therapy given requiring intensive monitoring for toxicity.  Shared medical decision making was utilized in creating the patients health plan today.  I independently ordered and reviewed each unique test.    I reviewed external records: provider visit note from outside specialist.  I reviewed external records: previous EKG including cardiologist interpretation.    I reviewed external records: Cath report      ED cardiac monitoring rhythm strip was ordered and interpreted:  sinus rhythm, no evidence of an arrhythmia  ST Segments:Normal

## 2025-04-03 NOTE — DISCHARGE INSTRUCTIONS
Antibiotics as prescribed starting again tomorrow morning. Tylenol for pain. Follow up with pcp in 1 week if not improving.. FU with cardiology when called with appt. Return if worse, any concerns.

## 2025-04-06 LAB
BACTERIA SPEC CULT: NORMAL
BACTERIA SPEC CULT: NORMAL
SERVICE CMNT-IMP: NORMAL
SERVICE CMNT-IMP: NORMAL

## 2025-04-17 ENCOUNTER — HOSPITAL ENCOUNTER (OUTPATIENT)
Dept: CT IMAGING | Age: 65
Discharge: HOME | End: 2025-04-19
Payer: COMMERCIAL

## 2025-04-17 DIAGNOSIS — Z87.891 PERSONAL HISTORY OF TOBACCO USE: ICD-10-CM

## 2025-04-17 PROCEDURE — 71271 CT THORAX LUNG CANCER SCR C-: CPT

## 2025-04-18 ENCOUNTER — RESULTS FOLLOW-UP (OUTPATIENT)
Dept: PULMONOLOGY | Age: 65
End: 2025-04-18

## 2025-04-18 NOTE — RESULT ENCOUNTER NOTE
Good news--CT is normal.  No worrisome findings for lung cancer.  Would recommend follow up annual CT scan until you have been off of cigarettes for 15 years or the age of 80, whichever comes first.  Will arrange at next appointment.  Message sent to patient via [employer].

## 2025-04-25 ENCOUNTER — OFFICE VISIT (OUTPATIENT)
Dept: PULMONOLOGY | Age: 65
End: 2025-04-25
Payer: COMMERCIAL

## 2025-04-25 VITALS
HEART RATE: 77 BPM | RESPIRATION RATE: 20 BRPM | OXYGEN SATURATION: 97 % | SYSTOLIC BLOOD PRESSURE: 150 MMHG | BODY MASS INDEX: 50.6 KG/M2 | WEIGHT: 268 LBS | HEIGHT: 61 IN | TEMPERATURE: 98 F | DIASTOLIC BLOOD PRESSURE: 80 MMHG

## 2025-04-25 DIAGNOSIS — J43.9 PULMONARY EMPHYSEMA, UNSPECIFIED EMPHYSEMA TYPE (HCC): Primary | ICD-10-CM

## 2025-04-25 DIAGNOSIS — J98.4 RESTRICTIVE LUNG DISEASE: ICD-10-CM

## 2025-04-25 DIAGNOSIS — Z87.891 PERSONAL HISTORY OF TOBACCO USE: ICD-10-CM

## 2025-04-25 DIAGNOSIS — E66.01 MORBID (SEVERE) OBESITY DUE TO EXCESS CALORIES (HCC): ICD-10-CM

## 2025-04-25 PROCEDURE — 99214 OFFICE O/P EST MOD 30 MIN: CPT | Performed by: INTERNAL MEDICINE

## 2025-04-25 PROCEDURE — 3079F DIAST BP 80-89 MM HG: CPT | Performed by: INTERNAL MEDICINE

## 2025-04-25 PROCEDURE — G0296 VISIT TO DETERM LDCT ELIG: HCPCS | Performed by: INTERNAL MEDICINE

## 2025-04-25 PROCEDURE — 3077F SYST BP >= 140 MM HG: CPT | Performed by: INTERNAL MEDICINE

## 2025-04-25 RX ORDER — ALBUTEROL SULFATE 90 UG/1
2 INHALANT RESPIRATORY (INHALATION) EVERY 4 HOURS PRN
Qty: 3 EACH | Refills: 3 | Status: SHIPPED | OUTPATIENT
Start: 2025-04-25

## 2025-04-25 RX ORDER — UMECLIDINIUM BROMIDE AND VILANTEROL TRIFENATATE 62.5; 25 UG/1; UG/1
1 POWDER RESPIRATORY (INHALATION) DAILY
Qty: 3 EACH | Refills: 3 | Status: SHIPPED | OUTPATIENT
Start: 2025-04-25

## 2025-04-25 NOTE — PROGRESS NOTES
Name:  Bethany Danielle  YOB: 1960   MRN: [id number]      Office Visit: 4/25/2025       Assessment & Plan (Medical Decision Making)    Impression: 64 y.o. female with a history of former tobacco abuse, mixed restrictive lung disease due to obesity and COPD currently well-controlled on Anoro.  Also with recent episodes of chest pain that sound atypical for the diagnosis of pneumonia that she was given in the emergency room.  Repeat annual screening CT scan April 2025 without any concerning findings.  Still not smoking.    -Will be due for next lung cancer screening CT scan in April 2026.  Ordered today.  - Refilled Anoro and as needed albuterol.  - Congratulated on ongoing smoking abstinence.  - Encouraged ongoing efforts at weight loss.  She is already on Ozempic and plans to talk to her primary care doctor and endocrinologist about other options.  She may want to consider gastric bypass surgery if all else fails.  - Has follow-up appoint with cardiology coming up in the near future.  Encouraged her to discuss her chest pains with them at that time and seek more immediate care should they return in the meantime.    Follow-up in 1 year    1. Pulmonary emphysema, unspecified emphysema type (HCC)      2. Restrictive lung disease      3. Personal history of tobacco use      4. Morbid (severe) obesity due to excess calories (HCC)      Orders Placed This Encounter   Medications    albuterol sulfate HFA (PROVENTIL;VENTOLIN;PROAIR) 108 (90 Base) MCG/ACT inhaler     Sig: Inhale 2 puffs into the lungs every 4 hours as needed for Wheezing or Shortness of Breath     Dispense:  3 each     Refill:  3    umeclidinium-vilanterol (ANORO ELLIPTA) 62.5-25 MCG/ACT inhaler     Sig: Inhale 1 puff into the lungs daily     Dispense:  3 each     Refill:  3         Procedures    DE VISIT TO DISCUSS LUNG CA SCREEN W LDCT     Follow-up and Dispositions    Return in about 1 year (around 4/25/2026) for CT chest before next  Post-Care Instructions: I reviewed with the patient in detail post-care instructions. Patient is to wear sunprotection, and avoid picking at any of the treated lesions. Pt may apply Vaseline to crusted or scabbing areas. Show Topical Anesthesia Variable?: Yes Medical Necessity Information: It is in your best interest to select a reason for this procedure from the list below. All of these items fulfill various CMS LCD requirements except the new and changing color options. Include Z78.9 (Other Specified Conditions Influencing Health Status) As An Associated Diagnosis?: No Detail Level: Detailed Medical Necessity Clause: This procedure was medically necessary because the lesions that were treated were: Consent: The patient's consent was obtained including but not limited to risks of crusting, scabbing, blistering, scarring, darker or lighter pigmentary change, recurrence, incomplete removal and infection. Spray Paint Text: The liquid nitrogen was applied to the skin utilizing a spray paint frosting technique.

## 2025-05-16 LAB
ALBUMIN SERPL-MCNC: 4.3 G/DL (ref 3.9–4.9)
ALP SERPL-CCNC: 82 IU/L (ref 44–121)
ALT SERPL-CCNC: 19 IU/L (ref 0–32)
AST SERPL-CCNC: 19 IU/L (ref 0–40)
BILIRUB SERPL-MCNC: <0.2 MG/DL (ref 0–1.2)
BUN SERPL-MCNC: 19 MG/DL (ref 8–27)
BUN/CREAT SERPL: 22 (ref 12–28)
CALCIUM SERPL-MCNC: 9.3 MG/DL (ref 8.7–10.3)
CHLORIDE SERPL-SCNC: 103 MMOL/L (ref 96–106)
CO2 SERPL-SCNC: 22 MMOL/L (ref 20–29)
CREAT SERPL-MCNC: 0.86 MG/DL (ref 0.57–1)
EGFRCR SERPLBLD CKD-EPI 2021: 76 ML/MIN/1.73
GLOBULIN SER CALC-MCNC: 1.6 G/DL (ref 1.5–4.5)
GLUCOSE SERPL-MCNC: 106 MG/DL (ref 70–99)
HBA1C MFR BLD: 5.5 % (ref 4.8–5.6)
POTASSIUM SERPL-SCNC: 4.2 MMOL/L (ref 3.5–5.2)
PROT SERPL-MCNC: 5.9 G/DL (ref 6–8.5)
SODIUM SERPL-SCNC: 143 MMOL/L (ref 134–144)
T4 SERPL-MCNC: 8 UG/DL (ref 4.5–12)
TSH SERPL DL<=0.005 MIU/L-ACNC: 3.54 UIU/ML (ref 0.45–4.5)

## 2025-05-19 ENCOUNTER — OFFICE VISIT (OUTPATIENT)
Dept: ENDOCRINOLOGY | Age: 65
End: 2025-05-19
Payer: COMMERCIAL

## 2025-05-19 VITALS
BODY MASS INDEX: 50.07 KG/M2 | DIASTOLIC BLOOD PRESSURE: 70 MMHG | OXYGEN SATURATION: 97 % | SYSTOLIC BLOOD PRESSURE: 126 MMHG | HEART RATE: 87 BPM | HEIGHT: 61 IN | WEIGHT: 265.2 LBS

## 2025-05-19 DIAGNOSIS — E78.00 HYPERCHOLESTEREMIA: ICD-10-CM

## 2025-05-19 DIAGNOSIS — I10 ESSENTIAL HYPERTENSION: ICD-10-CM

## 2025-05-19 DIAGNOSIS — E11.65 TYPE 2 DIABETES MELLITUS WITH HYPERGLYCEMIA, WITHOUT LONG-TERM CURRENT USE OF INSULIN (HCC): ICD-10-CM

## 2025-05-19 DIAGNOSIS — E03.9 PRIMARY HYPOTHYROIDISM: Primary | ICD-10-CM

## 2025-05-19 DIAGNOSIS — R80.9 ALBUMINURIA: ICD-10-CM

## 2025-05-19 DIAGNOSIS — E04.2 MULTIPLE THYROID NODULES: ICD-10-CM

## 2025-05-19 DIAGNOSIS — Z80.8 FAMILY HISTORY OF THYROID CANCER: ICD-10-CM

## 2025-05-19 PROCEDURE — 3074F SYST BP LT 130 MM HG: CPT | Performed by: INTERNAL MEDICINE

## 2025-05-19 PROCEDURE — 99214 OFFICE O/P EST MOD 30 MIN: CPT | Performed by: INTERNAL MEDICINE

## 2025-05-19 PROCEDURE — 3044F HG A1C LEVEL LT 7.0%: CPT | Performed by: INTERNAL MEDICINE

## 2025-05-19 PROCEDURE — 3078F DIAST BP <80 MM HG: CPT | Performed by: INTERNAL MEDICINE

## 2025-05-19 RX ORDER — SEMAGLUTIDE 2.68 MG/ML
2 INJECTION, SOLUTION SUBCUTANEOUS WEEKLY
Qty: 9 ML | Refills: 3 | Status: SHIPPED | OUTPATIENT
Start: 2025-05-19

## 2025-05-19 RX ORDER — BLOOD SUGAR DIAGNOSTIC
STRIP MISCELLANEOUS
Qty: 300 EACH | Refills: 3 | Status: SHIPPED
Start: 2025-05-19

## 2025-05-19 RX ORDER — LANCETS 33 GAUGE
EACH MISCELLANEOUS
Qty: 300 EACH | Refills: 3 | Status: SHIPPED
Start: 2025-05-19

## 2025-05-19 RX ORDER — EVOLOCUMAB 140 MG/ML
140 INJECTION, SOLUTION SUBCUTANEOUS
Qty: 6 ML | Refills: 5 | Status: SHIPPED | OUTPATIENT
Start: 2025-05-19

## 2025-05-19 RX ORDER — LEVOTHYROXINE SODIUM 25 UG/1
25 TABLET ORAL
Qty: 90 TABLET | Refills: 3 | Status: SHIPPED | OUTPATIENT
Start: 2025-05-19

## 2025-05-19 RX ORDER — PRAVASTATIN SODIUM 40 MG
40 TABLET ORAL DAILY
Qty: 90 TABLET | Refills: 3 | Status: SHIPPED | OUTPATIENT
Start: 2025-05-19

## 2025-05-19 RX ORDER — OLMESARTAN MEDOXOMIL 5 MG/1
5 TABLET ORAL DAILY
Qty: 90 TABLET | Refills: 3 | Status: SHIPPED | OUTPATIENT
Start: 2025-05-19

## 2025-05-19 ASSESSMENT — ENCOUNTER SYMPTOMS
VOICE CHANGE: 0
TROUBLE SWALLOWING: 0

## 2025-05-19 NOTE — PROGRESS NOTES
YURIDIA Cleary MD, [facility] ENDOCRINOLOGY   AND   THYROID NODULE CLINIC            Reason for visit: Follow-up of hypothyroidism, thyroid nodule, type 2 diabetes mellitus      ASSESSMENT AND PLAN:    1. Primary hypothyroidism  Bethany Danielle is biochemically euthyroid.  Continue thyroid hormone replacement as prescribed.   Residual symptoms are attributable to something other than thyroid dysfunction.   Repeat thyroid function tests prior to the next appointment.  - levothyroxine (SYNTHROID) 25 MCG tablet; Take 1 tablet by mouth every morning (before breakfast)  Dispense: 90 tablet; Refill: 3  - TSH; Future  - T4, Free; Future    2. Type 2 diabetes mellitus with hyperglycemia, without long-term current use of insulin ([facility])  Glycemic control is excellent.  No changes.  he does not require specialty care for diabetes.  Defer ongoing evaluation management to Dr. Esposito.  - OZEMPIC, 2 MG/DOSE, 8 MG/3ML SOPN sc injection; Inject 2 mg into the skin once a week  Dispense: 9 mL; Refill: 3  - ONETOUCH VERIO strip; Check blood glucose 3 times daily.  Dx E11.9  Dispense: 300 each; Refill: 3  - OneTouch Delica Lancets 33G MISC; Check blood glucose 3 times daily.  Dx E11.9  Dispense: 300 each; Refill: 3  - Comprehensive Metabolic Panel; Future  - Hemoglobin A1C; Future    3. Essential hypertension  BP: 126/70   - olmesartan (BENICAR) 5 MG tablet; Take 1 tablet by mouth daily  Dispense: 90 tablet; Refill: 3    4. Albuminuria  Continue angiotensin receptor blocker therapy.  - olmesartan (BENICAR) 5 MG tablet; Take 1 tablet by mouth daily  Dispense: 90 tablet; Refill: 3    5. Hypercholesteremia  Lab Results   Component Value Date    LDL 27 11/22/2022     - REPATHA SURECLICK 140 MG/ML SOAJ; Inject 140 mg into the skin every 14 days  Dispense: 6 mL; Refill: 5  - pravastatin (PRAVACHOL) 40 MG tablet; Take 1 tablet by mouth daily  Dispense: 90 tablet; Refill: 3          Follow-up and Dispositions    Return in about 1

## 2025-07-02 ENCOUNTER — PATIENT MESSAGE (OUTPATIENT)
Age: 65
End: 2025-07-02

## 2025-07-02 DIAGNOSIS — E78.00 HYPERCHOLESTEREMIA: ICD-10-CM

## 2025-07-02 DIAGNOSIS — R80.9 ALBUMINURIA: ICD-10-CM

## 2025-07-02 DIAGNOSIS — I10 ESSENTIAL HYPERTENSION: ICD-10-CM

## 2025-07-02 RX ORDER — OLMESARTAN MEDOXOMIL 5 MG/1
5 TABLET ORAL DAILY
Qty: 90 TABLET | Refills: 3 | Status: SHIPPED | OUTPATIENT
Start: 2025-07-02

## 2025-07-02 RX ORDER — PRAVASTATIN SODIUM 40 MG
40 TABLET ORAL DAILY
Qty: 90 TABLET | Refills: 3 | Status: CANCELLED | OUTPATIENT
Start: 2025-07-02

## 2025-07-02 RX ORDER — PRAVASTATIN SODIUM 40 MG
40 TABLET ORAL DAILY
Qty: 90 TABLET | Refills: 3 | Status: SHIPPED | OUTPATIENT
Start: 2025-07-02

## 2025-07-02 RX ORDER — OLMESARTAN MEDOXOMIL 5 MG/1
5 TABLET ORAL DAILY
Qty: 90 TABLET | Refills: 3 | Status: CANCELLED | OUTPATIENT
Start: 2025-07-02

## 2025-08-15 ENCOUNTER — OFFICE VISIT (OUTPATIENT)
Age: 65
End: 2025-08-15
Payer: COMMERCIAL

## 2025-08-15 VITALS
DIASTOLIC BLOOD PRESSURE: 64 MMHG | HEART RATE: 80 BPM | SYSTOLIC BLOOD PRESSURE: 120 MMHG | BODY MASS INDEX: 49.69 KG/M2 | WEIGHT: 263 LBS

## 2025-08-15 DIAGNOSIS — I20.9 ANGINA, CLASS III: ICD-10-CM

## 2025-08-15 DIAGNOSIS — E78.5 HYPERLIPIDEMIA LDL GOAL <70: Primary | ICD-10-CM

## 2025-08-15 DIAGNOSIS — E11.9 TYPE 2 DIABETES MELLITUS WITHOUT COMPLICATION, UNSPECIFIED WHETHER LONG TERM INSULIN USE (HCC): ICD-10-CM

## 2025-08-15 DIAGNOSIS — I25.10 ASCVD (ARTERIOSCLEROTIC CARDIOVASCULAR DISEASE): ICD-10-CM

## 2025-08-15 PROCEDURE — 3074F SYST BP LT 130 MM HG: CPT | Performed by: INTERNAL MEDICINE

## 2025-08-15 PROCEDURE — 3078F DIAST BP <80 MM HG: CPT | Performed by: INTERNAL MEDICINE

## 2025-08-15 PROCEDURE — 3044F HG A1C LEVEL LT 7.0%: CPT | Performed by: INTERNAL MEDICINE

## 2025-08-15 PROCEDURE — 99215 OFFICE O/P EST HI 40 MIN: CPT | Performed by: INTERNAL MEDICINE

## 2025-08-15 RX ORDER — SODIUM CHLORIDE 9 MG/ML
INJECTION, SOLUTION INTRAVENOUS CONTINUOUS
OUTPATIENT
Start: 2025-08-15

## 2025-08-15 RX ORDER — METOPROLOL SUCCINATE 25 MG/1
25 TABLET, EXTENDED RELEASE ORAL DAILY
Qty: 90 TABLET | Refills: 3 | Status: SHIPPED | OUTPATIENT
Start: 2025-08-15

## 2025-08-15 RX ORDER — SODIUM CHLORIDE 0.9 % (FLUSH) 0.9 %
5-40 SYRINGE (ML) INJECTION PRN
OUTPATIENT
Start: 2025-08-15

## 2025-08-15 RX ORDER — ASPIRIN 325 MG
325 TABLET ORAL ONCE
OUTPATIENT
Start: 2025-08-15 | End: 2025-08-15

## 2025-08-15 RX ORDER — NITROGLYCERIN 0.4 MG/1
0.4 TABLET SUBLINGUAL EVERY 5 MIN PRN
Qty: 25 TABLET | Refills: 3 | Status: SHIPPED | OUTPATIENT
Start: 2025-08-15

## 2025-08-15 RX ORDER — SODIUM CHLORIDE 9 MG/ML
INJECTION, SOLUTION INTRAVENOUS PRN
OUTPATIENT
Start: 2025-08-15

## 2025-08-15 RX ORDER — ISOSORBIDE MONONITRATE 30 MG/1
30 TABLET, EXTENDED RELEASE ORAL DAILY
Qty: 90 TABLET | Refills: 3
Start: 2025-08-15

## 2025-08-15 RX ORDER — LORAZEPAM 0.5 MG/1
0.5 TABLET ORAL
OUTPATIENT
Start: 2025-08-15

## 2025-08-15 RX ORDER — SODIUM CHLORIDE 0.9 % (FLUSH) 0.9 %
5-40 SYRINGE (ML) INJECTION EVERY 12 HOURS SCHEDULED
OUTPATIENT
Start: 2025-08-15

## 2025-08-25 ENCOUNTER — HOSPITAL ENCOUNTER (OUTPATIENT)
Age: 65
Setting detail: OUTPATIENT SURGERY
Discharge: HOME | End: 2025-08-25
Attending: INTERNAL MEDICINE | Admitting: INTERNAL MEDICINE
Payer: COMMERCIAL

## 2025-08-25 VITALS
RESPIRATION RATE: 14 BRPM | HEART RATE: 62 BPM | DIASTOLIC BLOOD PRESSURE: 59 MMHG | OXYGEN SATURATION: 94 % | WEIGHT: 263 LBS | BODY MASS INDEX: 49.65 KG/M2 | SYSTOLIC BLOOD PRESSURE: 106 MMHG | HEIGHT: 61 IN | TEMPERATURE: 98.1 F

## 2025-08-25 DIAGNOSIS — I20.9 ANGINA, CLASS III: ICD-10-CM

## 2025-08-25 LAB
ANION GAP SERPL CALC-SCNC: 13 MMOL/L (ref 7–16)
BASOPHILS # BLD: 0.08 K/UL (ref 0–0.2)
BASOPHILS NFR BLD: 0.9 % (ref 0–2)
BUN SERPL-MCNC: 17 MG/DL (ref 8–23)
CALCIUM SERPL-MCNC: 9.4 MG/DL (ref 8.8–10.2)
CHLORIDE SERPL-SCNC: 104 MMOL/L (ref 98–107)
CO2 SERPL-SCNC: 24 MMOL/L (ref 20–29)
CREAT SERPL-MCNC: 0.93 MG/DL (ref 0.6–1.1)
DIFFERENTIAL METHOD BLD: NORMAL
ECHO BSA: 2.27 M2
EKG ATRIAL RATE: 69 BPM
EKG DIAGNOSIS: NORMAL
EKG P AXIS: 58 DEGREES
EKG P-R INTERVAL: 214 MS
EKG Q-T INTERVAL: 422 MS
EKG QRS DURATION: 90 MS
EKG QTC CALCULATION (BAZETT): 452 MS
EKG R AXIS: 26 DEGREES
EKG T AXIS: 46 DEGREES
EKG VENTRICULAR RATE: 69 BPM
EOSINOPHIL # BLD: 0.22 K/UL (ref 0–0.8)
EOSINOPHIL NFR BLD: 2.6 % (ref 0.5–7.8)
ERYTHROCYTE [DISTWIDTH] IN BLOOD BY AUTOMATED COUNT: 13.7 % (ref 11.9–14.6)
GLUCOSE SERPL-MCNC: 106 MG/DL (ref 70–99)
HCT VFR BLD AUTO: 44.2 % (ref 35.8–46.3)
HGB BLD-MCNC: 14.4 G/DL (ref 11.7–15.4)
IMM GRANULOCYTES # BLD AUTO: 0.02 K/UL (ref 0–0.5)
IMM GRANULOCYTES NFR BLD AUTO: 0.2 % (ref 0–5)
LYMPHOCYTES # BLD: 2.23 K/UL (ref 0.5–4.6)
LYMPHOCYTES NFR BLD: 26.2 % (ref 13–44)
MAGNESIUM SERPL-MCNC: 1.9 MG/DL (ref 1.8–2.4)
MCH RBC QN AUTO: 28.9 PG (ref 26.1–32.9)
MCHC RBC AUTO-ENTMCNC: 32.6 G/DL (ref 31.4–35)
MCV RBC AUTO: 88.6 FL (ref 82–102)
MONOCYTES # BLD: 0.74 K/UL (ref 0.1–1.3)
MONOCYTES NFR BLD: 8.7 % (ref 4–12)
NEUTS SEG # BLD: 5.21 K/UL (ref 1.7–8.2)
NEUTS SEG NFR BLD: 61.4 % (ref 43–78)
NRBC # BLD: 0 K/UL (ref 0–0.2)
PLATELET # BLD AUTO: 219 K/UL (ref 150–450)
PMV BLD AUTO: 9.6 FL (ref 9.4–12.3)
POTASSIUM SERPL-SCNC: 3.9 MMOL/L (ref 3.5–5.1)
RBC # BLD AUTO: 4.99 M/UL (ref 4.05–5.2)
SODIUM SERPL-SCNC: 141 MMOL/L (ref 136–145)
WBC # BLD AUTO: 8.5 K/UL (ref 4.3–11.1)

## 2025-08-25 PROCEDURE — 85025 COMPLETE CBC W/AUTO DIFF WBC: CPT

## 2025-08-25 PROCEDURE — 99152 MOD SED SAME PHYS/QHP 5/>YRS: CPT | Performed by: INTERNAL MEDICINE

## 2025-08-25 PROCEDURE — 93005 ELECTROCARDIOGRAM TRACING: CPT | Performed by: INTERNAL MEDICINE

## 2025-08-25 PROCEDURE — 93458 L HRT ARTERY/VENTRICLE ANGIO: CPT | Performed by: INTERNAL MEDICINE

## 2025-08-25 PROCEDURE — C1769 GUIDE WIRE: HCPCS | Performed by: INTERNAL MEDICINE

## 2025-08-25 PROCEDURE — 2500000003 HC RX 250 WO HCPCS: Performed by: INTERNAL MEDICINE

## 2025-08-25 PROCEDURE — 80048 BASIC METABOLIC PNL TOTAL CA: CPT

## 2025-08-25 PROCEDURE — 93010 ELECTROCARDIOGRAM REPORT: CPT | Performed by: INTERNAL MEDICINE

## 2025-08-25 PROCEDURE — 6360000004 HC RX CONTRAST MEDICATION: Performed by: INTERNAL MEDICINE

## 2025-08-25 PROCEDURE — C1894 INTRO/SHEATH, NON-LASER: HCPCS | Performed by: INTERNAL MEDICINE

## 2025-08-25 PROCEDURE — 6360000002 HC RX W HCPCS: Performed by: INTERNAL MEDICINE

## 2025-08-25 PROCEDURE — 2709999900 HC NON-CHARGEABLE SUPPLY: Performed by: INTERNAL MEDICINE

## 2025-08-25 PROCEDURE — 83735 ASSAY OF MAGNESIUM: CPT

## 2025-08-25 RX ORDER — ASPIRIN 81 MG/1
324 TABLET, CHEWABLE ORAL DAILY
Status: DISCONTINUED | OUTPATIENT
Start: 2025-08-25 | End: 2025-08-25 | Stop reason: HOSPADM

## 2025-08-25 RX ORDER — SODIUM CHLORIDE 9 MG/ML
INJECTION, SOLUTION INTRAVENOUS CONTINUOUS
Status: DISCONTINUED | OUTPATIENT
Start: 2025-08-25 | End: 2025-08-25 | Stop reason: HOSPADM

## 2025-08-25 RX ORDER — ACETAMINOPHEN 325 MG/1
650 TABLET ORAL EVERY 4 HOURS PRN
OUTPATIENT
Start: 2025-08-25

## 2025-08-25 RX ORDER — SODIUM CHLORIDE 0.9 % (FLUSH) 0.9 %
5-40 SYRINGE (ML) INJECTION EVERY 12 HOURS SCHEDULED
OUTPATIENT
Start: 2025-08-25

## 2025-08-25 RX ORDER — MIDAZOLAM HYDROCHLORIDE 1 MG/ML
INJECTION, SOLUTION INTRAMUSCULAR; INTRAVENOUS PRN
Status: DISCONTINUED | OUTPATIENT
Start: 2025-08-25 | End: 2025-08-25 | Stop reason: HOSPADM

## 2025-08-25 RX ORDER — HEPARIN SODIUM 200 [USP'U]/100ML
INJECTION, SOLUTION INTRAVENOUS CONTINUOUS PRN
Status: COMPLETED | OUTPATIENT
Start: 2025-08-25 | End: 2025-08-25

## 2025-08-25 RX ORDER — SODIUM CHLORIDE 0.9 % (FLUSH) 0.9 %
5-40 SYRINGE (ML) INJECTION PRN
OUTPATIENT
Start: 2025-08-25

## 2025-08-25 RX ORDER — SODIUM CHLORIDE 9 MG/ML
INJECTION, SOLUTION INTRAVENOUS PRN
OUTPATIENT
Start: 2025-08-25

## 2025-08-25 RX ORDER — LIDOCAINE HYDROCHLORIDE 10 MG/ML
INJECTION, SOLUTION INFILTRATION; PERINEURAL PRN
Status: DISCONTINUED | OUTPATIENT
Start: 2025-08-25 | End: 2025-08-25 | Stop reason: HOSPADM

## 2025-08-25 RX ORDER — IOPAMIDOL 755 MG/ML
INJECTION, SOLUTION INTRAVASCULAR PRN
Status: DISCONTINUED | OUTPATIENT
Start: 2025-08-25 | End: 2025-08-25 | Stop reason: HOSPADM

## 2025-08-25 ASSESSMENT — PAIN SCALES - GENERAL
PAINLEVEL_OUTOF10: 0

## 2025-08-28 ENCOUNTER — PATIENT MESSAGE (OUTPATIENT)
Age: 65
End: 2025-08-28

## 2025-08-28 ENCOUNTER — TELEPHONE (OUTPATIENT)
Age: 65
End: 2025-08-28

## 2025-08-28 DIAGNOSIS — E78.00 HYPERCHOLESTEREMIA: ICD-10-CM

## 2025-08-28 RX ORDER — EVOLOCUMAB 140 MG/ML
140 INJECTION, SOLUTION SUBCUTANEOUS
Qty: 6 ML | Refills: 5 | Status: SHIPPED | OUTPATIENT
Start: 2025-08-28 | End: 2025-08-29 | Stop reason: SDUPTHER

## 2025-08-29 RX ORDER — EVOLOCUMAB 140 MG/ML
140 INJECTION, SOLUTION SUBCUTANEOUS
Qty: 6 ADJUSTABLE DOSE PRE-FILLED PEN SYRINGE | Refills: 3 | Status: SHIPPED | OUTPATIENT
Start: 2025-08-29

## (undated) DEVICE — GUIDEWIRE 035IN 210CM PTFE COAT FIX COR J TIP 15MM FIRM BODY

## (undated) DEVICE — CATHETER DIAG AD 5FR L110CM 145DEG COR GRY HYDRPHLC NYL

## (undated) DEVICE — KIT INTRO 6FR L10CM MINI WIRE L45CM DIA0.021IN NDL 21GA ANT

## (undated) DEVICE — BAND COMPR L24CM REG CLR PLAS HEMSTAT EXT HK AND LOOP RETEN

## (undated) DEVICE — Device

## (undated) DEVICE — GUIDEWIRE VASC L150CM DIA0.035IN TIP L3MM PTFE J CRV FIX

## (undated) DEVICE — CATHETER ANGIO 5FR L110CM COR NYL POLYUR S STL RAD TIGER 4